# Patient Record
Sex: FEMALE | Race: WHITE | Employment: UNEMPLOYED | URBAN - METROPOLITAN AREA
[De-identification: names, ages, dates, MRNs, and addresses within clinical notes are randomized per-mention and may not be internally consistent; named-entity substitution may affect disease eponyms.]

---

## 2022-04-11 ENCOUNTER — HOSPITAL ENCOUNTER (EMERGENCY)
Age: 42
Discharge: HOME | End: 2022-04-11
Attending: EMERGENCY MEDICINE
Payer: MEDICARE

## 2022-04-11 VITALS
DIASTOLIC BLOOD PRESSURE: 99 MMHG | OXYGEN SATURATION: 95 % | HEART RATE: 110 BPM | WEIGHT: 293 LBS | TEMPERATURE: 99.1 F | RESPIRATION RATE: 18 BRPM | HEIGHT: 66 IN | SYSTOLIC BLOOD PRESSURE: 150 MMHG | BODY MASS INDEX: 47.09 KG/M2

## 2022-04-11 DIAGNOSIS — R07.89 CHEST WALL PAIN: Primary | ICD-10-CM

## 2022-04-11 DIAGNOSIS — R05.9 COUGH: ICD-10-CM

## 2022-04-11 PROCEDURE — 99283 EMERGENCY DEPT VISIT LOW MDM: CPT

## 2022-04-11 PROCEDURE — 96372 THER/PROPH/DIAG INJ SC/IM: CPT

## 2022-04-11 PROCEDURE — 6360000002 HC RX W HCPCS: Performed by: EMERGENCY MEDICINE

## 2022-04-11 RX ORDER — KETOROLAC TROMETHAMINE 30 MG/ML
30 INJECTION, SOLUTION INTRAMUSCULAR; INTRAVENOUS ONCE
Status: COMPLETED | OUTPATIENT
Start: 2022-04-11 | End: 2022-04-11

## 2022-04-11 RX ORDER — LISINOPRIL 10 MG/1
10 TABLET ORAL DAILY
COMMUNITY
End: 2022-09-29 | Stop reason: ALTCHOICE

## 2022-04-11 RX ADMIN — KETOROLAC TROMETHAMINE 30 MG: 30 INJECTION, SOLUTION INTRAMUSCULAR at 01:36

## 2022-04-11 ASSESSMENT — ENCOUNTER SYMPTOMS
GASTROINTESTINAL NEGATIVE: 1
COUGH: 1
ALLERGIC/IMMUNOLOGIC NEGATIVE: 1
EYES NEGATIVE: 1

## 2022-04-11 ASSESSMENT — PAIN DESCRIPTION - PAIN TYPE: TYPE: ACUTE PAIN

## 2022-04-11 ASSESSMENT — PAIN SCALES - GENERAL
PAINLEVEL_OUTOF10: 4
PAINLEVEL_OUTOF10: 4

## 2022-04-11 ASSESSMENT — PAIN DESCRIPTION - LOCATION: LOCATION: CHEST

## 2022-04-11 ASSESSMENT — PAIN DESCRIPTION - ORIENTATION: ORIENTATION: MID

## 2022-04-11 NOTE — ED PROVIDER NOTES
eMERGENCY dEPARTMENT eNCOUnter      Pt Name: Brooklyn Velasco  MRN: [id number]  [place] 1980  Date of evaluation: 4/11/2022      CHIEF COMPLAINT       Chief Complaint   Patient presents with    Cough     States was seen recently was treated for pneumonia and run out of her medications. HISTORY OF PRESENT ILLNESS    Brooklyn Velasco is a 35 y.o. female who presents  Patient is a 40-year-old female coming in today with cough and chest pain when she coughs for the last several weeks. Patient is well-known to t multiple emergency departments and  has been in for several visits this month for the same complaint. She says that she has been taking azithromycin at home that she was prescribed a few days ago in this ED for bronchitis. She says that she only has chest pain when she coughs and is sharp. No chest pressure. No history of cardiac disease. She has had multiple negative cardiac workups recently. Normal chest x-ray couple days ago. Denies any fevers or chills. No abdominal pain. Patient states that she took her own cough medicine before she came into the emergency department today. She is afebrile and nontoxic looking. Room air saturations are 95%. Of note the patient has had 10 ER visits in the last 1-1/2 months. REVIEW OF SYSTEMS       Review of Systems   Constitutional: Negative. HENT: Negative. Eyes: Negative. Respiratory: Positive for cough. Cardiovascular: Negative. Gastrointestinal: Negative. Endocrine: Negative. Genitourinary: Negative. Musculoskeletal: Negative. Skin: Negative. Allergic/Immunologic: Negative. Neurological: Negative. Hematological: Negative. Psychiatric/Behavioral: Negative. PAST MEDICAL HISTORY    has a past medical history of Diabetes mellitus ([facility] [address] Hernia of anterior abdominal wall, Hypertension, and Restless leg syndrome.     SURGICAL HISTORY      has a past surgical history that includes Appendectomy; back surgery; and Hysterectomy. CURRENT MEDICATIONS       Previous Medications    INSULIN ASPART (NOVOLOG) 100 UNIT/ML INJECTION VIAL    Inject into the skin 3 times daily (before meals)    LISINOPRIL (PRINIVIL;ZESTRIL) 10 MG TABLET    Take 10 mg by mouth daily       ALLERGIES     is allergic to sulfa antibiotics. FAMILY HISTORY     has no family status information on file. family history is not on file. SOCIAL HISTORY      reports that she has never smoked. She does not have any smokeless tobacco history on file. She reports that she does not drink alcohol and does not use drugs. PHYSICAL EXAM     INITIAL VITALS:  height is 5' 6\" (1.676 m) and weight is 135.2 kg (298 lb). Her oral temperature is 99.1 °F (37.3 °C). Her blood pressure is 150/99 (abnormal) and her pulse is 110. Her respiration is 18 and oxygen saturation is 95%. Constitutional: Alert, oriented x3, nontoxic, afebrile, answering questions appropriately, acting properly for age, in no acute distress  HEENT: Extraocular muscles intact, mucus membranes moist, TMs clear bilaterally, no posterior pharyngeal erythema or exudates, Pupils equal, round, reactive to light,   Neck: Trachea midline, Supple without lymphadenopathy, no posterior midline neck tenderness to palpation  Cardiovascular: Regular rhythm and rate no S3, S4, or murmurs  Respiratory: Clear to auscultation bilaterally no wheezes, rhonchi, rales, no respiratory distress  Gastrointestinal: Soft, nontender, nondistended, positive bowel sounds. No rebound, rigidity, or guarding. Musculoskeletal: No extremity pain or swelling  Neurologic: Moving all 4 extremities without difficulty there are no gross focal neurologic deficits  Skin: Warm and dry      DIFFERENTIAL DIAGNOSIS/ MDM:     Pleuritic chest pain that has been worked up multiple times. We will give the patient some Toradol and then discharged home to follow-up with her own doctors.   DIAGNOSTIC RESULTS     EKG: All EKG's are interpreted that there is no critically ER issue for us to pursue tonight.       Denise Huizar MD  Attending Emergency Physician            Denise Huizar MD  04/11/22 117

## 2022-04-12 PROCEDURE — 96374 THER/PROPH/DIAG INJ IV PUSH: CPT

## 2022-04-12 PROCEDURE — 99285 EMERGENCY DEPT VISIT HI MDM: CPT

## 2022-04-13 ENCOUNTER — HOSPITAL ENCOUNTER (EMERGENCY)
Age: 42
Discharge: HOME | End: 2022-04-13
Attending: EMERGENCY MEDICINE
Payer: MEDICARE

## 2022-04-13 ENCOUNTER — APPOINTMENT (OUTPATIENT)
Dept: GENERAL RADIOLOGY | Age: 42
End: 2022-04-13
Payer: MEDICARE

## 2022-04-13 ENCOUNTER — APPOINTMENT (OUTPATIENT)
Dept: CT IMAGING | Age: 42
End: 2022-04-13
Payer: MEDICARE

## 2022-04-13 VITALS
HEART RATE: 107 BPM | DIASTOLIC BLOOD PRESSURE: 89 MMHG | SYSTOLIC BLOOD PRESSURE: 132 MMHG | TEMPERATURE: 99 F | RESPIRATION RATE: 22 BRPM | OXYGEN SATURATION: 96 %

## 2022-04-13 DIAGNOSIS — R07.81 PLEURITIC CHEST PAIN: Primary | ICD-10-CM

## 2022-04-13 LAB
ABSOLUTE EOS #: 0 K/UL (ref 0–0.4)
ABSOLUTE IMMATURE GRANULOCYTE: 0 K/UL (ref 0–0.3)
ABSOLUTE LYMPH #: 4.25 K/UL (ref 1–4.8)
ABSOLUTE MONO #: 0.5 K/UL (ref 0.1–0.8)
ALBUMIN SERPL-MCNC: 4.4 G/DL (ref 3.5–5.2)
ALBUMIN/GLOBULIN RATIO: 1.3 (ref 1–2.5)
ALP BLD-CCNC: 100 U/L (ref 35–104)
ALT SERPL-CCNC: 21 U/L (ref 5–33)
ANION GAP SERPL CALCULATED.3IONS-SCNC: 13 MMOL/L (ref 9–17)
AST SERPL-CCNC: 30 U/L
BASOPHILS # BLD: 0 % (ref 0–2)
BASOPHILS ABSOLUTE: 0 K/UL (ref 0–0.2)
BILIRUB SERPL-MCNC: 0.21 MG/DL (ref 0.3–1.2)
BUN BLDV-MCNC: 10 MG/DL (ref 6–20)
CALCIUM SERPL-MCNC: 9.7 MG/DL (ref 8.6–10.4)
CHLORIDE BLD-SCNC: 98 MMOL/L (ref 98–107)
CO2: 24 MMOL/L (ref 20–31)
CREAT SERPL-MCNC: 0.52 MG/DL (ref 0.5–0.9)
D-DIMER QUANTITATIVE: 0.53 MG/L FEU
EOSINOPHILS RELATIVE PERCENT: 0 % (ref 1–4)
GFR AFRICAN AMERICAN: >60 ML/MIN
GFR NON-AFRICAN AMERICAN: >60 ML/MIN
GFR SERPL CREATININE-BSD FRML MDRD: ABNORMAL ML/MIN/{1.73_M2}
GLUCOSE BLD-MCNC: 362 MG/DL (ref 70–99)
HCT VFR BLD CALC: 43.2 % (ref 36.3–47.1)
HEMOGLOBIN: 14.9 G/DL (ref 11.9–15.1)
IMMATURE GRANULOCYTES: 0 %
LIPASE: 50 U/L (ref 13–60)
LYMPHOCYTES # BLD: 34 % (ref 24–44)
MAGNESIUM: 1.8 MG/DL (ref 1.6–2.6)
MCH RBC QN AUTO: 28.9 PG (ref 25.2–33.5)
MCHC RBC AUTO-ENTMCNC: 34.5 G/DL (ref 28.4–34.8)
MCV RBC AUTO: 83.9 FL (ref 82.6–102.9)
MONOCYTES # BLD: 4 % (ref 1–7)
MORPHOLOGY: NORMAL
NRBC AUTOMATED: 0 PER 100 WBC
PDW BLD-RTO: 13.3 % (ref 11.8–14.4)
PLATELET # BLD: 367 K/UL (ref 138–453)
PMV BLD AUTO: 9.4 FL (ref 8.1–13.5)
POTASSIUM SERPL-SCNC: 4.1 MMOL/L (ref 3.7–5.3)
PRO-BNP: 47 PG/ML
RBC # BLD: 5.15 M/UL (ref 3.95–5.11)
SEG NEUTROPHILS: 62 % (ref 36–66)
SEGMENTED NEUTROPHILS ABSOLUTE COUNT: 7.75 K/UL (ref 1.8–7.7)
SODIUM BLD-SCNC: 135 MMOL/L (ref 135–144)
TOTAL PROTEIN: 7.9 G/DL (ref 6.4–8.3)
TROPONIN, HIGH SENSITIVITY: <6 NG/L (ref 0–14)
WBC # BLD: 12.5 K/UL (ref 3.5–11.3)

## 2022-04-13 PROCEDURE — 71260 CT THORAX DX C+: CPT

## 2022-04-13 PROCEDURE — 84484 ASSAY OF TROPONIN QUANT: CPT

## 2022-04-13 PROCEDURE — 6360000004 HC RX CONTRAST MEDICATION: Performed by: STUDENT IN AN ORGANIZED HEALTH CARE EDUCATION/TRAINING PROGRAM

## 2022-04-13 PROCEDURE — 85379 FIBRIN DEGRADATION QUANT: CPT

## 2022-04-13 PROCEDURE — 6370000000 HC RX 637 (ALT 250 FOR IP): Performed by: STUDENT IN AN ORGANIZED HEALTH CARE EDUCATION/TRAINING PROGRAM

## 2022-04-13 PROCEDURE — 83735 ASSAY OF MAGNESIUM: CPT

## 2022-04-13 PROCEDURE — 83690 ASSAY OF LIPASE: CPT

## 2022-04-13 PROCEDURE — 71045 X-RAY EXAM CHEST 1 VIEW: CPT

## 2022-04-13 PROCEDURE — 93005 ELECTROCARDIOGRAM TRACING: CPT | Performed by: STUDENT IN AN ORGANIZED HEALTH CARE EDUCATION/TRAINING PROGRAM

## 2022-04-13 PROCEDURE — 80053 COMPREHEN METABOLIC PANEL: CPT

## 2022-04-13 PROCEDURE — 85025 COMPLETE CBC W/AUTO DIFF WBC: CPT

## 2022-04-13 PROCEDURE — 6360000002 HC RX W HCPCS: Performed by: STUDENT IN AN ORGANIZED HEALTH CARE EDUCATION/TRAINING PROGRAM

## 2022-04-13 PROCEDURE — 83880 ASSAY OF NATRIURETIC PEPTIDE: CPT

## 2022-04-13 RX ORDER — LORAZEPAM 0.5 MG/1
0.5 TABLET ORAL ONCE
Status: COMPLETED | OUTPATIENT
Start: 2022-04-13 | End: 2022-04-13

## 2022-04-13 RX ORDER — GUAIFENESIN 600 MG/1
600 TABLET, EXTENDED RELEASE ORAL 2 TIMES DAILY
Qty: 30 TABLET | Refills: 0 | Status: SHIPPED | OUTPATIENT
Start: 2022-04-13 | End: 2022-04-28

## 2022-04-13 RX ORDER — GUAIFENESIN 600 MG/1
600 TABLET, EXTENDED RELEASE ORAL ONCE
Status: DISCONTINUED | OUTPATIENT
Start: 2022-04-13 | End: 2022-04-13 | Stop reason: HOSPADM

## 2022-04-13 RX ORDER — ORPHENADRINE CITRATE 30 MG/ML
60 INJECTION INTRAMUSCULAR; INTRAVENOUS ONCE
Status: DISCONTINUED | OUTPATIENT
Start: 2022-04-13 | End: 2022-04-13

## 2022-04-13 RX ORDER — LORAZEPAM 0.5 MG/1
1 TABLET ORAL ONCE
Status: DISCONTINUED | OUTPATIENT
Start: 2022-04-13 | End: 2022-04-13 | Stop reason: HOSPADM

## 2022-04-13 RX ORDER — MORPHINE SULFATE 4 MG/ML
4 INJECTION, SOLUTION INTRAMUSCULAR; INTRAVENOUS ONCE
Status: COMPLETED | OUTPATIENT
Start: 2022-04-13 | End: 2022-04-13

## 2022-04-13 RX ORDER — BENZONATATE 100 MG/1
100 CAPSULE ORAL 3 TIMES DAILY PRN
Qty: 15 CAPSULE | Refills: 0 | Status: SHIPPED | OUTPATIENT
Start: 2022-04-13 | End: 2022-04-20

## 2022-04-13 RX ADMIN — LORAZEPAM 0.5 MG: 0.5 TABLET ORAL at 04:15

## 2022-04-13 RX ADMIN — IOPAMIDOL 85 ML: 755 INJECTION, SOLUTION INTRAVENOUS at 04:42

## 2022-04-13 RX ADMIN — MORPHINE SULFATE 4 MG: 4 INJECTION INTRAVENOUS at 04:09

## 2022-04-13 NOTE — ED NOTES
35year old female presents with chest pain and cough  No dyspnea noted  Verbalizes has green sputum  Patient verbalizes loss of appetite and smell to other caregiver  Patient is a/o, mother at bedside  Patient placed on cardiac monitor, BP cuff and pulse ox. Alarms set.   Call light given to patient      Rocky Quinteros RN  04/13/22 3526

## 2022-04-13 NOTE — ED PROVIDER NOTES
[facility]  ED  Emergency Department Encounter  Emergency Medicine Resident     Pt Name: Brooklyn Velasco  MRN: [id number]  [place] 1980  Date of evaluation: 4/13/22  PCP:  Andrea Leung MD    [address] [place] [address]       Chief Complaint   Patient presents with    Cough     dx with bronchitis. States medication is not working. Coughing up green mucus        HISTORY OFPRESENT ILLNESS  (Location/Symptom, Timing/Onset, Context/Setting, Quality, Duration, Modifying Factors,Severity.)      Brooklyn Velasco is a 35 y. o.yo female who presents with pleuritic chest pain and cough with associated shortness of breath. Patient states that pain has been intermittent for the past couple days has progressively worsened. Patient reports that she was actually being treated by her family doctor for bronchitis with azithromycin, she recently finished antibiotic 2 days ago however she feels as though her symptoms has came on full-blown. Patient denies any headache but states she is having generalized body aches, she denies nausea, vomiting, abdominal pain. PAST MEDICAL / SURGICAL / SOCIAL / FAMILY HISTORY      has a past medical history of Diabetes mellitus ([address] Hernia of anterior abdominal wall, Hypertension, and Restless leg syndrome. has a past surgical history that includes Appendectomy; back surgery; and Hysterectomy.      Social History     Socioeconomic History    Marital status: Single     Spouse name: Not on file    Number of children: Not on file    Years of education: Not on file    Highest education level: Not on file   Occupational History    Not on file   Tobacco Use    Smoking status: Never Smoker    Smokeless tobacco: Not on file   Substance and Sexual Activity    Alcohol use: Never    Drug use: Never    Sexual activity: Never   Other Topics Concern    Not on file   Social History Narrative    Not on file     Social Determinants of Health     Financial Resource Strain:     Difficulty of Paying Living Expenses: Not on file   Food Insecurity:     Worried About Running Out of Food in the Last Year: Not on file    Ran Out of Food in the Last Year: Not on file   Transportation Needs:     Lack of Transportation (Medical): Not on file    Lack of Transportation (Non-Medical): Not on file   Physical Activity:     Days of Exercise per Week: Not on file    Minutes of Exercise per Session: Not on file   Stress:     Feeling of Stress : Not on file   Social Connections:     Frequency of Communication with Friends and Family: Not on file    Frequency of Social Gatherings with Friends and Family: Not on file    Attends Synagogue Services: Not on file    Active Member of [address] [place] [address] [employer] or Organizations: Not on file    Attends Club or Organization Meetings: Not on file    Marital Status: Not on file   Intimate Partner Violence:     Fear of Current or Ex-Partner: Not on file    Emotionally Abused: Not on file    Physically Abused: Not on file    Sexually Abused: Not on file   Housing Stability:     Unable to Pay for Housing in the Last Year: Not on file    Number of [place] in the Last Year: Not on file    Unstable Housing in the Last Year: Not on file       History reviewed. No pertinent family history. Allergies:  Sulfa antibiotics    Home Medications:  Prior to Admission medications    Medication Sig Start Date End Date Taking?  Authorizing Provider   benzonatate (TESSALON PERLES) 100 MG capsule Take 1 capsule by mouth 3 times daily as needed for Cough 4/13/22 4/20/22 Yes Mehreen Florian MD   guaiFENesin (MUCINEX) 600 MG extended release tablet Take 1 tablet by mouth 2 times daily for 15 days 4/13/22 4/28/22 Yes Mehreen Florian MD   lisinopril (PRINIVIL;ZESTRIL) 10 MG tablet Take 10 mg by mouth daily    Historical Provider, MD   insulin aspart (NOVOLOG) 100 UNIT/ML injection vial Inject into the skin 3 times daily (before meals)    Historical Provider, MD       REVIEW OFSYSTEMS    (2-9 systems for level 4, 10 or more for level 5)      Review of Systems   Constitutional: Positive for fatigue. Negative for fever. Respiratory: Positive for shortness of breath. Cardiovascular: Positive for chest pain. Negative for leg swelling. Gastrointestinal: Negative for abdominal pain, nausea and vomiting. Musculoskeletal: Positive for myalgias. PHYSICAL EXAM   (up to 7 for level 4, 8 or more forlevel 5)      INITIAL VITALS:   ED Triage Vitals [04/12/22 2357]   BP Temp Temp src Pulse Resp SpO2 Height Weight   (!) 150/106 99 °F (37.2 °C) -- 103 21 95 % -- --       Physical Exam  Constitutional:       General: She is not in acute distress. HENT:      Head: Normocephalic and atraumatic. Mouth/Throat:      Mouth: Mucous membranes are moist.   Eyes:      Extraocular Movements: Extraocular movements intact. Pupils: Pupils are equal, round, and reactive to light. Cardiovascular:      Rate and Rhythm: Tachycardia present. Pulmonary:      Effort: Pulmonary effort is normal. No respiratory distress. Abdominal:      General: There is no distension. Musculoskeletal:         General: No swelling or tenderness. Cervical back: No rigidity or tenderness. Skin:     Capillary Refill: Capillary refill takes less than 2 seconds. Coloration: Skin is not jaundiced. Neurological:      General: No focal deficit present. Mental Status: She is alert.    Psychiatric:         Mood and Affect: Mood normal.         DIFFERENTIAL  DIAGNOSIS     PLAN (LABS / IMAGING / EKG):  Orders Placed This Encounter   Procedures    XR CHEST PORTABLE    CT CHEST PULMONARY EMBOLISM W CONTRAST    CBC with Auto Differential    D-Dimer, Quantitative    Troponin    Comprehensive Metabolic Panel    Magnesium    Lipase    Brain Natriuretic Peptide    Inpatient consult to Hospitalist    EKG 12 Lead       MEDICATIONS ORDERED:  Orders Placed This Encounter   Medications    iopamidol (ISOVUE-370) 76 % injection 85 mL    morphine injection 4 mg    DISCONTD: orphenadrine (NORFLEX) injection 60 mg    DISCONTD: LORazepam (ATIVAN) tablet 1 mg    LORazepam (ATIVAN) tablet 0.5 mg    DISCONTD: guaiFENesin (MUCINEX) extended release tablet 600 mg    benzonatate (TESSALON PERLES) 100 MG capsule     Sig: Take 1 capsule by mouth 3 times daily as needed for Cough     Dispense:  15 capsule     Refill:  0    guaiFENesin (MUCINEX) 600 MG extended release tablet     Sig: Take 1 tablet by mouth 2 times daily for 15 days     Dispense:  30 tablet     Refill:  0       Initial MDM/Plan: 35 y.o. female who presents with pleuritic chest pain with cough. Patient is tachycardic, but not hypoxic  Plan to get a dimer, if elevated, plan for CT chest  We will also get EKG, troponin, BNP, CBC and electrolytes  Disposition is pending on labs and imaging    DIAGNOSTIC RESULTS / EMERGENCYDEPARTMENT COURSE / MDM     LABS:  Labs Reviewed   CBC WITH AUTO DIFFERENTIAL - Abnormal; Notable for the following components:       Result Value    WBC 12.5 (*)     RBC 5.15 (*)     Eosinophils % 0 (*)     Segs Absolute 7.75 (*)     All other components within normal limits   COMPREHENSIVE METABOLIC PANEL - Abnormal; Notable for the following components:    Glucose 362 (*)     Total Bilirubin 0.21 (*)     All other components within normal limits   D-DIMER, QUANTITATIVE   TROPONIN   MAGNESIUM   LIPASE   BRAIN NATRIURETIC PEPTIDE         RADIOLOGY:  CT CHEST PULMONARY EMBOLISM W CONTRAST    Result Date: 4/13/2022  EXAMINATION: CTA OF THE CHEST 4/13/2022 3:45 am TECHNIQUE: CTA of the chest was performed after the administration of intravenous contrast.  Multiplanar reformatted images are provided for review. MIP images are provided for review. Dose modulation, iterative reconstruction, and/or weight based adjustment of the mA/kV was utilized to reduce the radiation dose to as low as reasonably achievable. COMPARISON: None.  HISTORY: ORDERING SYSTEM PROVIDED HISTORY: pleuritic chest pain with elevated dimer TECHNOLOGIST PROVIDED HISTORY: pleuritic chest pain with elevated dimer Decision Support Exception - unselect if not a suspected or confirmed emergency medical condition->Emergency Medical Condition (MA) Reason for Exam: pleuritic chest pain with elevated dimer =0.53 FINDINGS: Pulmonary Arteries: There is no significant intravascular contrast, therefore this is a nondiagnostic exam for evaluation of the pulmonary arteries. Mediastinum: No pericardial effusion or lymphadenopathy. Lungs/pleura: Respiratory motion artifact. No consolidation. No evidence for septal thickening. No effusion. The central airway is patent. Upper Abdomen: No acute findings identified. Soft Tissues/Bones: No acute bone or soft tissue abnormality. Multilevel vertebroplasty in the lower thoracic and upper lumbar spine. 1.  Nondiagnostic exam to evaluate for pulmonary embolism, as there is no significant intravascular contrast present. 2.  Respiratory motion artifact. No acute airspace disease identified. EKG      All EKG's are interpreted by the Emergency Department Physicianwho either signs or Co-signs this chart in the absence of a cardiologist.    EMERGENCY DEPARTMENT COURSE:  ED Course as of 04/14/22 0236   Wed Apr 13, 2022   0531 Pt CT chest was non-diagnostic due to lack of contrast because patient was moving her arm when contrast was been injected. And thus unable to visualize if there is a pulmonary embolism. I did have this discussion with patient and her mom who is in the room. Explained to patient that because the CT scan was nondiagnostic would like to have them admitted to get VQ scan done patient reports that she does not want to stay and instead wants to go home. Patient states that she will come back if her symptoms are worsening.   I did explain to patient at length that we are concerned for pulmonary embolism and that a PE can can be severely detrimental including causing a cardiac arrest, death. Patient and mother expressed their understanding. Patient does have decisional making capacity. [AN]      ED Course User Index  [AN] Seda Brink MD          PROCEDURES:  None    CONSULTS:  IP CONSULT TO HOSPITALIST    CRITICAL CARE:      FINAL IMPRESSION      1.  Pleuritic chest pain          DISPOSITION / PLAN     DISPOSITION Decision To Discharge 04/13/2022 05:30:25 AM      PATIENT REFERRED TO:  [facility]  [address] [address]  [phone]    If symptoms worsen    Jarrod Banuelos MD  [address] [facility]  [phone]      As needed      DISCHARGE MEDICATIONS:  Discharge Medication List as of 4/13/2022  5:38 AM      START taking these medications    Details   benzonatate (TESSALON PERLES) 100 MG capsule Take 1 capsule by mouth 3 times daily as needed for Cough, Disp-15 capsule, R-0Print      guaiFENesin (MUCINEX) 600 MG extended release tablet Take 1 tablet by mouth 2 times daily for 15 days, Disp-30 tablet, R-0Print             Seda Brink MD  Emergency Medicine Resident    (Please note that portions of this note were completed with a voice recognition program.Efforts were made to edit the dictations but occasionally words are mis-transcribed.)        Seda Brink MD  Resident  04/14/22 2142

## 2022-04-13 NOTE — ED PROVIDER NOTES
[facility]     Emergency Department     Faculty Attestation    I performed a history and physical examination of the patient and discussed management with the resident. I have reviewed and agree with the residents findings including all diagnostic interpretations, and treatment plans as written. Any areas of disagreement are noted on the chart. I was personally present for the key portions of any procedures. I have documented in the chart those procedures where I was not present during the key portions. I have reviewed the emergency nurses triage note. I agree with the chief complaint, past medical history, past surgical history, allergies, medications, social and family history as documented unless otherwise noted below. Documentation of the HPI, Physical Exam and Medical Decision Making performed by scribcharles is based on my personal performance of the HPI, PE and MDM. For Physician Assistant/ Nurse Practitioner cases/documentation I have personally evaluated this patient and have completed at least one if not all key elements of the E/M (history, physical exam, and MDM). Additional findings are as noted. 34 yo F c/o coughing green mucus, fever, no vomit,   pe vss gcs 15 joanne, posterior pharynx clear, no exudate, neck supple,   No calf tenderness, 1 + ankle edema,     Trop <6, ct r/o pe non diagnostic, pt not wishing to have further image,   Risk discussed, pt appears to have decision making capacity    EKG Interpretation    Interpreted by me  Sinus tachycardia, heart rate 110, no ischemia, left axis, QT corrected 438    CRITICAL CARE: There was a high probability of clinically significant/life threatening deterioration in this patient's condition which required my urgent intervention. Total critical care time was 5 minutes. This excludes any time for separately reportable procedures.        [address]  04/13/22 2600 Carlito Hercules, DO  04/13/22 Digna Capellan, DO  04/15/22 8383

## 2022-04-13 NOTE — ED NOTES
The following labs obtained by [facility] RN, labeled with pt sticker and tubed to lab: by Pavel student    [x] Blue     [x] Uzair Caldwell   [] on ice  [x] Green/yellow  [] Green/black [] on ice  [] Yellow  [] Red  [] Pink      [] COVID-19 swab    [] Rapid  [] PCR  [] Flu swab  [] Peds Viral Panel     [] Urine Sample  [] Pelvic Cultures  [] Blood Cultures            Li Garzon RN  04/13/22 7749

## 2022-04-13 NOTE — ED NOTES
Report given to Joy Hannah RN  No change in patient status  Continues to rest quietly       [facility]  04/13/22 9783

## 2022-04-14 LAB
EKG ATRIAL RATE: 110 BPM
EKG P AXIS: 17 DEGREES
EKG P-R INTERVAL: 132 MS
EKG Q-T INTERVAL: 324 MS
EKG QRS DURATION: 72 MS
EKG QTC CALCULATION (BAZETT): 438 MS
EKG R AXIS: -14 DEGREES
EKG T AXIS: 23 DEGREES
EKG VENTRICULAR RATE: 110 BPM

## 2022-04-14 PROCEDURE — 93010 ELECTROCARDIOGRAM REPORT: CPT | Performed by: INTERNAL MEDICINE

## 2022-04-14 ASSESSMENT — ENCOUNTER SYMPTOMS
SHORTNESS OF BREATH: 1
VOMITING: 0
NAUSEA: 0
ABDOMINAL PAIN: 0

## 2022-04-21 ENCOUNTER — APPOINTMENT (OUTPATIENT)
Dept: GENERAL RADIOLOGY | Age: 42
End: 2022-04-21
Payer: MEDICARE

## 2022-04-21 ENCOUNTER — APPOINTMENT (OUTPATIENT)
Dept: CT IMAGING | Age: 42
End: 2022-04-21
Payer: MEDICARE

## 2022-04-21 ENCOUNTER — HOSPITAL ENCOUNTER (EMERGENCY)
Age: 42
Discharge: HOME | End: 2022-04-22
Attending: EMERGENCY MEDICINE
Payer: MEDICARE

## 2022-04-21 DIAGNOSIS — J18.9 PNEUMONIA DUE TO INFECTIOUS ORGANISM, UNSPECIFIED LATERALITY, UNSPECIFIED PART OF LUNG: Primary | ICD-10-CM

## 2022-04-21 LAB
ABSOLUTE EOS #: 0.37 K/UL (ref 0–0.44)
ABSOLUTE IMMATURE GRANULOCYTE: 0.03 K/UL (ref 0–0.3)
ABSOLUTE LYMPH #: 3.68 K/UL (ref 1.1–3.7)
ABSOLUTE MONO #: 0.52 K/UL (ref 0.1–1.2)
ALBUMIN SERPL-MCNC: 4.2 G/DL (ref 3.5–5.2)
ALBUMIN/GLOBULIN RATIO: 1.5 (ref 1–2.5)
ALP BLD-CCNC: 102 U/L (ref 35–104)
ALT SERPL-CCNC: 21 U/L (ref 5–33)
ANION GAP SERPL CALCULATED.3IONS-SCNC: 14 MMOL/L (ref 9–17)
AST SERPL-CCNC: 37 U/L
BASOPHILS # BLD: 1 % (ref 0–2)
BASOPHILS ABSOLUTE: 0.07 K/UL (ref 0–0.2)
BILIRUB SERPL-MCNC: 0.18 MG/DL (ref 0.3–1.2)
BUN BLDV-MCNC: 9 MG/DL (ref 6–20)
CALCIUM SERPL-MCNC: 9.7 MG/DL (ref 8.6–10.4)
CHLORIDE BLD-SCNC: 98 MMOL/L (ref 98–107)
CHP ED QC CHECK: YES
CO2: 26 MMOL/L (ref 20–31)
CREAT SERPL-MCNC: 0.62 MG/DL (ref 0.5–0.9)
EOSINOPHILS RELATIVE PERCENT: 3 % (ref 1–4)
GFR AFRICAN AMERICAN: >60 ML/MIN
GFR NON-AFRICAN AMERICAN: >60 ML/MIN
GFR SERPL CREATININE-BSD FRML MDRD: ABNORMAL ML/MIN/{1.73_M2}
GLUCOSE BLD-MCNC: 344 MG/DL
GLUCOSE BLD-MCNC: 344 MG/DL (ref 65–105)
GLUCOSE BLD-MCNC: 415 MG/DL (ref 70–99)
HCG QUALITATIVE: NEGATIVE
HCT VFR BLD CALC: 44.5 % (ref 36.3–47.1)
HEMOGLOBIN: 14.2 G/DL (ref 11.9–15.1)
IMMATURE GRANULOCYTES: 0 %
LYMPHOCYTES # BLD: 31 % (ref 24–43)
MAGNESIUM: 1.8 MG/DL (ref 1.6–2.6)
MCH RBC QN AUTO: 28 PG (ref 25.2–33.5)
MCHC RBC AUTO-ENTMCNC: 31.9 G/DL (ref 28.4–34.8)
MCV RBC AUTO: 87.8 FL (ref 82.6–102.9)
MONOCYTES # BLD: 4 % (ref 3–12)
NRBC AUTOMATED: 0 PER 100 WBC
PDW BLD-RTO: 13.5 % (ref 11.8–14.4)
PLATELET # BLD: 391 K/UL (ref 138–453)
PMV BLD AUTO: 9.5 FL (ref 8.1–13.5)
POTASSIUM SERPL-SCNC: 4.2 MMOL/L (ref 3.7–5.3)
PRO-BNP: 47 PG/ML
RBC # BLD: 5.07 M/UL (ref 3.95–5.11)
SEG NEUTROPHILS: 60 % (ref 36–65)
SEGMENTED NEUTROPHILS ABSOLUTE COUNT: 7.14 K/UL (ref 1.5–8.1)
SODIUM BLD-SCNC: 138 MMOL/L (ref 135–144)
TOTAL PROTEIN: 7 G/DL (ref 6.4–8.3)
TROPONIN, HIGH SENSITIVITY: <6 NG/L (ref 0–14)
WBC # BLD: 11.8 K/UL (ref 3.5–11.3)

## 2022-04-21 PROCEDURE — 96375 TX/PRO/DX INJ NEW DRUG ADDON: CPT

## 2022-04-21 PROCEDURE — 82947 ASSAY GLUCOSE BLOOD QUANT: CPT

## 2022-04-21 PROCEDURE — 93005 ELECTROCARDIOGRAM TRACING: CPT | Performed by: STUDENT IN AN ORGANIZED HEALTH CARE EDUCATION/TRAINING PROGRAM

## 2022-04-21 PROCEDURE — 71045 X-RAY EXAM CHEST 1 VIEW: CPT

## 2022-04-21 PROCEDURE — 84484 ASSAY OF TROPONIN QUANT: CPT

## 2022-04-21 PROCEDURE — 83735 ASSAY OF MAGNESIUM: CPT

## 2022-04-21 PROCEDURE — 6360000002 HC RX W HCPCS: Performed by: STUDENT IN AN ORGANIZED HEALTH CARE EDUCATION/TRAINING PROGRAM

## 2022-04-21 PROCEDURE — 80053 COMPREHEN METABOLIC PANEL: CPT

## 2022-04-21 PROCEDURE — 96374 THER/PROPH/DIAG INJ IV PUSH: CPT

## 2022-04-21 PROCEDURE — 71260 CT THORAX DX C+: CPT

## 2022-04-21 PROCEDURE — 6360000002 HC RX W HCPCS: Performed by: EMERGENCY MEDICINE

## 2022-04-21 PROCEDURE — 6360000004 HC RX CONTRAST MEDICATION: Performed by: STUDENT IN AN ORGANIZED HEALTH CARE EDUCATION/TRAINING PROGRAM

## 2022-04-21 PROCEDURE — 85025 COMPLETE CBC W/AUTO DIFF WBC: CPT

## 2022-04-21 PROCEDURE — 83880 ASSAY OF NATRIURETIC PEPTIDE: CPT

## 2022-04-21 PROCEDURE — 84703 CHORIONIC GONADOTROPIN ASSAY: CPT

## 2022-04-21 PROCEDURE — 99285 EMERGENCY DEPT VISIT HI MDM: CPT

## 2022-04-21 PROCEDURE — 2580000003 HC RX 258: Performed by: STUDENT IN AN ORGANIZED HEALTH CARE EDUCATION/TRAINING PROGRAM

## 2022-04-21 RX ORDER — LORAZEPAM 2 MG/ML
1 INJECTION INTRAMUSCULAR ONCE
Status: COMPLETED | OUTPATIENT
Start: 2022-04-21 | End: 2022-04-21

## 2022-04-21 RX ORDER — AZITHROMYCIN 250 MG/1
TABLET, FILM COATED ORAL
Qty: 1 PACKET | Refills: 0 | Status: SHIPPED | OUTPATIENT
Start: 2022-04-21 | End: 2022-05-01

## 2022-04-21 RX ORDER — LORAZEPAM 2 MG/ML
2 INJECTION INTRAMUSCULAR ONCE
Status: DISCONTINUED | OUTPATIENT
Start: 2022-04-21 | End: 2022-04-21

## 2022-04-21 RX ORDER — MORPHINE SULFATE 4 MG/ML
2 INJECTION, SOLUTION INTRAMUSCULAR; INTRAVENOUS ONCE
Status: COMPLETED | OUTPATIENT
Start: 2022-04-21 | End: 2022-04-21

## 2022-04-21 RX ORDER — 0.9 % SODIUM CHLORIDE 0.9 %
1000 INTRAVENOUS SOLUTION INTRAVENOUS ONCE
Status: COMPLETED | OUTPATIENT
Start: 2022-04-21 | End: 2022-04-21

## 2022-04-21 RX ORDER — 0.9 % SODIUM CHLORIDE 0.9 %
1000 INTRAVENOUS SOLUTION INTRAVENOUS ONCE
Status: COMPLETED | OUTPATIENT
Start: 2022-04-21 | End: 2022-04-22

## 2022-04-21 RX ADMIN — LORAZEPAM 1 MG: 2 INJECTION INTRAMUSCULAR; INTRAVENOUS at 22:37

## 2022-04-21 RX ADMIN — MORPHINE SULFATE 2 MG: 4 INJECTION INTRAVENOUS at 22:37

## 2022-04-21 RX ADMIN — CEFTRIAXONE SODIUM 1000 MG: 1 INJECTION, POWDER, FOR SOLUTION INTRAMUSCULAR; INTRAVENOUS at 23:42

## 2022-04-21 RX ADMIN — IOPAMIDOL 85 ML: 755 INJECTION, SOLUTION INTRAVENOUS at 22:51

## 2022-04-21 RX ADMIN — SODIUM CHLORIDE 1000 ML: 9 INJECTION, SOLUTION INTRAVENOUS at 23:43

## 2022-04-21 RX ADMIN — SODIUM CHLORIDE 1000 ML: 9 INJECTION, SOLUTION INTRAVENOUS at 21:20

## 2022-04-22 VITALS
TEMPERATURE: 98.8 F | WEIGHT: 293 LBS | OXYGEN SATURATION: 94 % | HEART RATE: 114 BPM | DIASTOLIC BLOOD PRESSURE: 84 MMHG | RESPIRATION RATE: 22 BRPM | BODY MASS INDEX: 48.42 KG/M2 | SYSTOLIC BLOOD PRESSURE: 142 MMHG

## 2022-04-22 ASSESSMENT — ENCOUNTER SYMPTOMS
SHORTNESS OF BREATH: 1
NAUSEA: 0
COUGH: 1
ABDOMINAL PAIN: 0
VOMITING: 0

## 2022-04-22 NOTE — ED NOTES
Pt reports to the ED with complaints of chest pain. Pt was seen recently in the ED and was recommended to have a CT scan to rule out PE, but the scan was never completed. Pt stating, \"my PCP told me not to. \" Pt stating she tested positive for covid and flu yesterday, but after further evaluation, results are showing negative and pt was diagnosed with an ear infection. Pt is alert and speaking in complete sentences. Pt is resting in bed comfortably, NAD noted. EKG obtained.  Pt placed on full cardiac monitor     Meghana Ingram RN  04/21/22 4894

## 2022-04-22 NOTE — ED NOTES
The following labs labeled with pt sticker and tubed to lab:     [x] Blue     [x] Lavender   [] on ice  [x] Green/yellow  [] Green/black [] on ice  [x] Yellow  [] Red  [] Pink      [] COVID-19 swab    [] Rapid  [] PCR  [] Flu swab  [] Peds Viral Panel     [] Urine Sample  [] Pelvic Cultures  [] Blood Cultures          Mecredes Banks RN  04/21/22 5405

## 2022-04-22 NOTE — ED PROVIDER NOTES
[facility] ED  Emergency Department Encounter  Emergency Medicine Resident     Pt Name: Sharonda Tam  MRN: [id number]  [place] 1980  Date of evaluation: 4/21/22  PCP:  Ancelmo Gonzalez MD    CHIEF COMPLAINT       Chief Complaint   Patient presents with    Chest Pain    Illness    Mental Health Problem       HISTORY OFPRESENT ILLNESS  (Location/Symptom, Timing/Onset, Context/Setting, Quality, Duration, Modifying Factors,Severity.)      Sharonda Tam is a 35 y. o.yo female who frequently presents to the emergency room due to chest pain or shortness of breath, was recently seen at urgent care yesterday and was found to have negative flu and negative COVID. Patient states that her symptoms are worsening and thus presents to be evaluated. Patient states that her PCP does not want her to be had any CT scans done. Patient denies any history of long distance travel, recent surgery, recent procedure, history of DVT or pulmonary embolism. Her main concern is chest pain with cough and shortness of breath mainly with ambulation. PAST MEDICAL / SURGICAL / SOCIAL / FAMILY HISTORY      has a past medical history of Diabetes mellitus ([address] Hernia of anterior abdominal wall, Hypertension, and Restless leg syndrome. has a past surgical history that includes Appendectomy; back surgery; and Hysterectomy.      Social History     Socioeconomic History    Marital status: Single     Spouse name: Not on file    Number of children: Not on file    Years of education: Not on file    Highest education level: Not on file   Occupational History    Not on file   Tobacco Use    Smoking status: Never Smoker    Smokeless tobacco: Not on file   Substance and Sexual Activity    Alcohol use: Never    Drug use: Never    Sexual activity: Never   Other Topics Concern    Not on file   Social History Narrative    Not on file     Social Determinants of Health     Financial Resource Strain:     Difficulty of Paying Living Expenses: Not on file   Food Insecurity:     Worried About Running Out of Food in the Last Year: Not on file    Ran Out of Food in the Last Year: Not on file   Transportation Needs:     Lack of Transportation (Medical): Not on file    Lack of Transportation (Non-Medical): Not on file   Physical Activity:     Days of Exercise per Week: Not on file    Minutes of Exercise per Session: Not on file   Stress:     Feeling of Stress : Not on file   Social Connections:     Frequency of Communication with Friends and Family: Not on file    Frequency of Social Gatherings with Friends and Family: Not on file    Attends Mandaen Services: Not on file    Active Member of [address] [place] [address] [employer] or Organizations: Not on file    Attends Club or Organization Meetings: Not on file    Marital Status: Not on file   Intimate Partner Violence:     Fear of Current or Ex-Partner: Not on file    Emotionally Abused: Not on file    Physically Abused: Not on file    Sexually Abused: Not on file   Housing Stability:     Unable to Pay for Housing in the Last Year: Not on file    Number of [place] in the Last Year: Not on file    Unstable Housing in the Last Year: Not on file       History reviewed. No pertinent family history. Allergies:  Sulfa antibiotics    Home Medications:  Prior to Admission medications    Medication Sig Start Date End Date Taking?  Authorizing Provider   azithromycin (ZITHROMAX) 250 MG tablet Take 2 tablets (500 mg) on Day 1, followed by 1 tablet (250 mg) once daily on Days 2 through 5. 4/21/22 5/1/22 Yes Tamar Terrell MD   guaiFENesin (MUCINEX) 600 MG extended release tablet Take 1 tablet by mouth 2 times daily for 15 days 4/13/22 4/28/22  Tamar Terrell MD   lisinopril (PRINIVIL;ZESTRIL) 10 MG tablet Take 10 mg by mouth daily    Historical Provider, MD   insulin aspart (NOVOLOG) 100 UNIT/ML injection vial Inject into the skin 3 times daily (before meals)    Historical Provider, MD REVIEW OFSYSTEMS    (2-9 systems for level 4, 10 or more for level 5)      Review of Systems   Constitutional: Positive for fatigue and fever. Respiratory: Positive for cough and shortness of breath. Cardiovascular: Positive for chest pain. Gastrointestinal: Negative for abdominal pain, nausea and vomiting. PHYSICAL EXAM   (up to 7 for level 4, 8 or more forlevel 5)      INITIAL VITALS:   ED Triage Vitals   BP Temp Temp Source Pulse Resp SpO2 Height Weight   04/21/22 2042 04/21/22 2042 04/21/22 2042 04/21/22 2042 04/21/22 2042 04/21/22 2042 -- 04/21/22 2110   (!) 142/84 98.8 °F (37.1 °C) Oral 110 21 95 %  300 lb (136.1 kg)       Physical Exam  Constitutional:       Appearance: Normal appearance. HENT:      Head: Normocephalic and atraumatic. Nose: Nose normal.   Eyes:      Extraocular Movements: Extraocular movements intact. Pupils: Pupils are equal, round, and reactive to light. Cardiovascular:      Rate and Rhythm: Tachycardia present. Pulmonary:      Effort: No respiratory distress. Abdominal:      General: There is no distension. Tenderness: There is no abdominal tenderness. Musculoskeletal:         General: No swelling or tenderness. Cervical back: No rigidity. Skin:     Capillary Refill: Capillary refill takes less than 2 seconds. Coloration: Skin is not jaundiced. Neurological:      General: No focal deficit present. Mental Status: She is alert.    Psychiatric:         Mood and Affect: Mood normal.         DIFFERENTIAL  DIAGNOSIS     PLAN (LABS / IMAGING / EKG):  Orders Placed This Encounter   Procedures    XR CHEST PORTABLE    CT CHEST PULMONARY EMBOLISM W CONTRAST    CBC with Auto Differential    Troponin    Brain Natriuretic Peptide    Comprehensive Metabolic Panel    Magnesium    HCG Qualitative, Serum    POCT Glucose    POC Glucose Fingerstick    EKG 12 Lead       MEDICATIONS ORDERED:  Orders Placed This Encounter   Medications    TECHNIQUE: CTA of the chest was performed after the administration of intravenous contrast.  Multiplanar reformatted images are provided for review. MIP images are provided for review. Dose modulation, iterative reconstruction, and/or weight based adjustment of the mA/kV was utilized to reduce the radiation dose to as low as reasonably achievable. COMPARISON: 04/13/2022 HISTORY: ORDERING SYSTEM PROVIDED HISTORY: r/o PE TECHNOLOGIST PROVIDED HISTORY: r/o PE Decision Support Exception - unselect if not a suspected or confirmed emergency medical condition->Emergency Medical Condition (MA) Reason for Exam: r/o PE FINDINGS: Pulmonary Arteries: Pulmonary arteries are adequately opacified for evaluation. No evidence of intraluminal filling defect to suggest pulmonary embolism. Evaluation for subsegmental PE is limited due to respiratory motion and contrast timing. Main pulmonary artery is normal in caliber. Mediastinum: No evidence of mediastinal lymphadenopathy. The heart and pericardium demonstrate no acute abnormality. There is no acute abnormality of the thoracic aorta. Lungs/pleura: The lungs are without acute process. No focal consolidation or pulmonary edema. No evidence of pleural effusion or pneumothorax. Upper Abdomen: Limited images of the upper abdomen are unremarkable. Soft Tissues/Bones: Compression deformity at T10 and T12 with prior kyphoplasty. Mild degenerative change. 1. No large or central pulmonary embolus. 2. No acute pulmonary abnormality. EKG      All EKG's are interpreted by the Emergency Department Physicianwho either signs or Co-signs this chart in the absence of a cardiologist.    EMERGENCY DEPARTMENT COURSE:  ED Course as of 04/22/22 2201 Fri Apr 22, 2022 2200 Patient found to have hyperglycemia 415, after IV fluids, her sugar did come down to 344.   Her x-ray did show pneumonia and thus she will be started on Z-Abraham, she was given a dose of Rocephin here in the emergency room.  Her CT scan was negative for pulmonary embolism, patient to be discharged. [AN]      ED Course User Index  [AN] Tiara Petty MD          PROCEDURES:  None    CONSULTS:  None    CRITICAL CARE:      FINAL IMPRESSION      1.  Pneumonia due to infectious organism, unspecified laterality, unspecified part of lung          DISPOSITION / PLAN     DISPOSITION Decision To Discharge 04/21/2022 11:54:43 PM      PATIENT REFERRED TO:  [facility] ED  [address] [place] [address]  [phone]    If symptoms worsen    Shauna Severs, MD  [address] [place] [address]  [phone]      As needed      DISCHARGE MEDICATIONS:  Discharge Medication List as of 4/21/2022 11:57 PM      START taking these medications    Details   azithromycin (ZITHROMAX) 250 MG tablet Take 2 tablets (500 mg) on Day 1, followed by 1 tablet (250 mg) once daily on Days 2 through 5., Disp-1 packet, R-0Print             Tiara Petty MD  Emergency Medicine Resident    (Please note that portions of this note were completed with a voice recognition program.Efforts were made to edit the dictations but occasionally words are mis-transcribed.)        Tiara Petty MD  Resident  04/22/22 [id number]

## 2022-04-22 NOTE — ED PROVIDER NOTES
[address]   Emergency Department  Emergency Medicine Attending Sign-out     Care of Sharonda Tam was assumed from previous attending Dr. Zeb Cyr and is being seen for Chest Pain, Illness, and Mental Health Problem  . The patient's initial evaluation and plan have been discussed with the prior provider who initially evaluated the patient. Attestation  I was available and discussed any additional care issues that arose and coordinated the management plans with the resident(s) caring for the patient during my duty period. Any areas of disagreement with resident's documentation of care or procedures are noted on the chart. I was personally present for the key portions of any/all procedures, during my duty period. I have documented in the chart those procedures where I was not present during the key portions. BRIEF PATIENT SUMMARY/MDM COURSE PER INITIAL PROVIDER:   RECENT VITALS:     Temp: 98.8 °F (37.1 °C),  Pulse: 110, Resp: 21, BP: (!) 142/84, SpO2: 95 %    This patient is a 35 y.o. Female with history of diabetes noncompliant with chest pain shortness of breath and tachycardia up to the 130s. Chest x-ray read as possible pleural effusion versus pneumonia. Awaiting CT imaging.     DIAGNOSTICS/MEDICATIONS:     MEDICATIONS GIVEN:  ED Medication Orders (From admission, onward)    Start Ordered     Status Ordering Provider    04/21/22 2330 04/21/22 2322  0.9 % sodium chloride bolus  ONCE         Acknowledged ANCA NIX    04/21/22 2315 04/21/22 2311  cefTRIAXone (ROCEPHIN) 1000 mg IVPB in NS 50ml minibag  ONCE        Question:  Antimicrobial Indications  Answer:  Pneumonia (CAP)    Last MAR action: New Bag - by [place] Gene on 04/21/22 at [address]    04/21/22 2245 04/21/22 2235  morphine injection 2 mg  ONCE         Last MAR action: Given - by [place] Gene on 04/21/22 at [address]    04/21/22 2245 04/21/22 2235  LORazepam (ATIVAN) injection 1 mg  ONCE Last MAR action: Given - by Kat Marquez on 04/21/22 at 1 [facility]    04/21/22 2224 04/21/22 2224  iopamidol (ISOVUE-370) 76 % injection 85 mL  IMG ONCE PRN         Last MAR action: Given - by Diane Mcdowell on 04/21/22 at 130 [facility]    04/21/22 2130 04/21/22 2117  0.9 % sodium chloride bolus  ONCE         Last MAR action: Stopped - by Kat Manuelmir on 04/21/22 at 2[address] [facility]          LABS    Labs Reviewed   CBC WITH AUTO DIFFERENTIAL - Abnormal; Notable for the following components:       Result Value    WBC 11.8 (*)     All other components within normal limits   COMPREHENSIVE METABOLIC PANEL - Abnormal; Notable for the following components:    Glucose 415 (*)     AST 37 (*)     Total Bilirubin 0.18 (*)     All other components within normal limits   TROPONIN   BRAIN NATRIURETIC PEPTIDE   MAGNESIUM   HCG, SERUM, QUALITATIVE   POCT GLUCOSE       RADIOLOGY  XR CHEST PORTABLE    Result Date: 4/21/2022  EXAMINATION: ONE XRAY VIEW OF THE CHEST 4/21/2022 10:05 pm COMPARISON: 04/13/2022 HISTORY: ORDERING SYSTEM PROVIDED HISTORY: chest pain TECHNOLOGIST PROVIDED HISTORY: chest pain FINDINGS: Cardiomediastinal silhouette is unchanged. The lungs are underinflated. Small left pleural effusion. There is left basilar opacity. No pneumothorax. No acute osseous abnormality. Left pleural effusion with adjacent opacity, atelectasis versus pneumonia. CT CHEST PULMONARY EMBOLISM W CONTRAST    Result Date: 4/21/2022  EXAMINATION: CTA OF THE CHEST 4/21/2022 10:28 pm TECHNIQUE: CTA of the chest was performed after the administration of intravenous contrast.  Multiplanar reformatted images are provided for review. MIP images are provided for review. Dose modulation, iterative reconstruction, and/or weight based adjustment of the mA/kV was utilized to reduce the radiation dose to as low as reasonably achievable.  COMPARISON: 04/13/2022 HISTORY: ORDERING SYSTEM PROVIDED HISTORY: r/o PE TECHNOLOGIST PROVIDED HISTORY: r/o PE Decision Support Exception - unselect if not a suspected or confirmed emergency medical condition->Emergency Medical Condition (MA) Reason for Exam: r/o PE FINDINGS: Pulmonary Arteries: Pulmonary arteries are adequately opacified for evaluation. No evidence of intraluminal filling defect to suggest pulmonary embolism. Evaluation for subsegmental PE is limited due to respiratory motion and contrast timing. Main pulmonary artery is normal in caliber. Mediastinum: No evidence of mediastinal lymphadenopathy. The heart and pericardium demonstrate no acute abnormality. There is no acute abnormality of the thoracic aorta. Lungs/pleura: The lungs are without acute process. No focal consolidation or pulmonary edema. No evidence of pleural effusion or pneumothorax. Upper Abdomen: Limited images of the upper abdomen are unremarkable. Soft Tissues/Bones: Compression deformity at T10 and T12 with prior kyphoplasty. Mild degenerative change. 1. No large or central pulmonary embolus. 2. No acute pulmonary abnormality.        OUTSTANDING TASKS / ADDITIONAL COMMENTS   1. CT imaging    Caesar Severance, MD  Emergency Medicine Attending  [facility]        Mercedes Stahl MD  04/21/22 4277

## 2022-04-22 NOTE — ED PROVIDER NOTES
[facility]     Emergency Department     Faculty Attestation    I performed a history and physical examination of the patient and discussed management with the resident. I reviewed the residents note and agree with the documented findings and plan of care. Any areas of disagreement are noted on the chart. I was personally present for the key portions of any procedures. I have documented in the chart those procedures where I was not present during the key portions. I have reviewed the emergency nurses triage note. I agree with the chief complaint, past medical history, past surgical history, allergies, medications, social and family history as documented unless otherwise noted below. For Physician Assistant/ Nurse Practitioner cases/documentation I have personally evaluated this patient and have completed at least one if not all key elements of the E/M (history, physical exam, and MDM). Additional findings are as noted. I have personally seen and evaluated the patient. I find the patient's history and physical exam are consistent with the NP/PA documentation. I agree with the care provided, treatment rendered, disposition and follow-up plan. Reporting chest pain and dyspnea is noted to be tachycardic on exam recent evaluation for same patient is a diabetic who is relatively noncompliant with her treatment plan. As she is resting comfortably no acute distress at the present time      Critical Care     Vandana Robledo M.D.   Attending Emergency  Physician              Epi Washburn MD  04/21/22 6311

## 2022-04-23 LAB
EKG ATRIAL RATE: 113 BPM
EKG P AXIS: 31 DEGREES
EKG P-R INTERVAL: 130 MS
EKG Q-T INTERVAL: 318 MS
EKG QRS DURATION: 82 MS
EKG QTC CALCULATION (BAZETT): 436 MS
EKG R AXIS: 8 DEGREES
EKG T AXIS: 31 DEGREES
EKG VENTRICULAR RATE: 113 BPM

## 2022-04-23 PROCEDURE — 93010 ELECTROCARDIOGRAM REPORT: CPT | Performed by: INTERNAL MEDICINE

## 2022-09-29 ENCOUNTER — HOSPITAL ENCOUNTER (OUTPATIENT)
Age: 42
Setting detail: SPECIMEN
Discharge: HOME | End: 2022-09-29

## 2022-09-29 ENCOUNTER — OFFICE VISIT (OUTPATIENT)
Dept: FAMILY MEDICINE CLINIC | Age: 42
End: 2022-09-29
Payer: MEDICARE

## 2022-09-29 VITALS
TEMPERATURE: 97.4 F | HEART RATE: 111 BPM | WEIGHT: 284 LBS | DIASTOLIC BLOOD PRESSURE: 82 MMHG | SYSTOLIC BLOOD PRESSURE: 128 MMHG | OXYGEN SATURATION: 98 % | BODY MASS INDEX: 45.84 KG/M2

## 2022-09-29 DIAGNOSIS — Z00.00 ENCOUNTER FOR MEDICAL EXAMINATION TO ESTABLISH CARE: Primary | ICD-10-CM

## 2022-09-29 DIAGNOSIS — E11.9 TYPE 2 DIABETES MELLITUS WITHOUT COMPLICATION, WITH LONG-TERM CURRENT USE OF INSULIN (HCC): ICD-10-CM

## 2022-09-29 DIAGNOSIS — I10 PRIMARY HYPERTENSION: ICD-10-CM

## 2022-09-29 DIAGNOSIS — M54.41 CHRONIC RIGHT-SIDED LOW BACK PAIN WITH RIGHT-SIDED SCIATICA: ICD-10-CM

## 2022-09-29 DIAGNOSIS — Z79.4 TYPE 2 DIABETES MELLITUS WITHOUT COMPLICATION, WITH LONG-TERM CURRENT USE OF INSULIN (HCC): ICD-10-CM

## 2022-09-29 DIAGNOSIS — M25.551 RIGHT HIP PAIN: ICD-10-CM

## 2022-09-29 DIAGNOSIS — F41.9 ANXIETY: ICD-10-CM

## 2022-09-29 DIAGNOSIS — Z13.29 THYROID DISORDER SCREEN: ICD-10-CM

## 2022-09-29 DIAGNOSIS — G89.29 CHRONIC RIGHT-SIDED LOW BACK PAIN WITH RIGHT-SIDED SCIATICA: ICD-10-CM

## 2022-09-29 DIAGNOSIS — Z13.220 LIPID SCREENING: ICD-10-CM

## 2022-09-29 DIAGNOSIS — F33.0 MILD EPISODE OF RECURRENT MAJOR DEPRESSIVE DISORDER (HCC): ICD-10-CM

## 2022-09-29 DIAGNOSIS — B36.9 FUNGAL DERMATITIS: ICD-10-CM

## 2022-09-29 DIAGNOSIS — G25.81 RLS (RESTLESS LEGS SYNDROME): ICD-10-CM

## 2022-09-29 LAB
ABSOLUTE EOS #: 0.42 K/UL (ref 0–0.44)
ABSOLUTE IMMATURE GRANULOCYTE: 0.05 K/UL (ref 0–0.3)
ABSOLUTE LYMPH #: 3.29 K/UL (ref 1.1–3.7)
ABSOLUTE MONO #: 0.64 K/UL (ref 0.1–1.2)
ALBUMIN SERPL-MCNC: 4.4 G/DL (ref 3.5–5.2)
ALBUMIN/GLOBULIN RATIO: 1.2 (ref 1–2.5)
ALP BLD-CCNC: 89 U/L (ref 35–104)
ALT SERPL-CCNC: 20 U/L (ref 5–33)
ANION GAP SERPL CALCULATED.3IONS-SCNC: 20 MMOL/L (ref 9–17)
AST SERPL-CCNC: 28 U/L
BASOPHILS # BLD: 1 % (ref 0–2)
BASOPHILS ABSOLUTE: 0.09 K/UL (ref 0–0.2)
BILIRUB SERPL-MCNC: <0.1 MG/DL (ref 0.3–1.2)
BUN BLDV-MCNC: 11 MG/DL (ref 6–20)
CALCIUM SERPL-MCNC: 9.6 MG/DL (ref 8.6–10.4)
CHLORIDE BLD-SCNC: 94 MMOL/L (ref 98–107)
CHOLESTEROL, FASTING: 225 MG/DL
CHOLESTEROL/HDL RATIO: 6.4
CO2: 22 MMOL/L (ref 20–31)
CREAT SERPL-MCNC: 0.58 MG/DL (ref 0.5–0.9)
EOSINOPHILS RELATIVE PERCENT: 4 % (ref 1–4)
GFR AFRICAN AMERICAN: >60 ML/MIN
GFR NON-AFRICAN AMERICAN: >60 ML/MIN
GFR SERPL CREATININE-BSD FRML MDRD: ABNORMAL ML/MIN/{1.73_M2}
GLUCOSE BLD-MCNC: 414 MG/DL (ref 70–99)
HCT VFR BLD CALC: 48.4 % (ref 36.3–47.1)
HDLC SERPL-MCNC: 35 MG/DL
HEMOGLOBIN: 15.5 G/DL (ref 11.9–15.1)
IMMATURE GRANULOCYTES: 1 %
LDL CHOLESTEROL: 134 MG/DL (ref 0–130)
LYMPHOCYTES # BLD: 30 % (ref 24–43)
MCH RBC QN AUTO: 28.1 PG (ref 25.2–33.5)
MCHC RBC AUTO-ENTMCNC: 32 G/DL (ref 28.4–34.8)
MCV RBC AUTO: 87.8 FL (ref 82.6–102.9)
MONOCYTES # BLD: 6 % (ref 3–12)
NRBC AUTOMATED: 0 PER 100 WBC
PDW BLD-RTO: 12.8 % (ref 11.8–14.4)
PLATELET # BLD: 394 K/UL (ref 138–453)
PMV BLD AUTO: 10 FL (ref 8.1–13.5)
POTASSIUM SERPL-SCNC: 4.2 MMOL/L (ref 3.7–5.3)
RBC # BLD: 5.51 M/UL (ref 3.95–5.11)
SEG NEUTROPHILS: 58 % (ref 36–65)
SEGMENTED NEUTROPHILS ABSOLUTE COUNT: 6.33 K/UL (ref 1.5–8.1)
SODIUM BLD-SCNC: 136 MMOL/L (ref 135–144)
TOTAL PROTEIN: 8.2 G/DL (ref 6.4–8.3)
TRIGLYCERIDE, FASTING: 280 MG/DL
TSH SERPL DL<=0.05 MIU/L-ACNC: 0.95 UIU/ML (ref 0.3–5)
WBC # BLD: 10.8 K/UL (ref 3.5–11.3)

## 2022-09-29 PROCEDURE — G8417 CALC BMI ABV UP PARAM F/U: HCPCS | Performed by: NURSE PRACTITIONER

## 2022-09-29 PROCEDURE — 1036F TOBACCO NON-USER: CPT | Performed by: NURSE PRACTITIONER

## 2022-09-29 PROCEDURE — 99204 OFFICE O/P NEW MOD 45 MIN: CPT | Performed by: NURSE PRACTITIONER

## 2022-09-29 PROCEDURE — G8428 CUR MEDS NOT DOCUMENT: HCPCS | Performed by: NURSE PRACTITIONER

## 2022-09-29 PROCEDURE — 3046F HEMOGLOBIN A1C LEVEL >9.0%: CPT | Performed by: NURSE PRACTITIONER

## 2022-09-29 PROCEDURE — 2022F DILAT RTA XM EVC RTNOPTHY: CPT | Performed by: NURSE PRACTITIONER

## 2022-09-29 RX ORDER — HYDROCODONE BITARTRATE AND ACETAMINOPHEN 5; 325 MG/1; MG/1
1 TABLET ORAL EVERY 6 HOURS PRN
Qty: 12 TABLET | Refills: 0 | Status: SHIPPED | OUTPATIENT
Start: 2022-09-29 | End: 2022-10-11 | Stop reason: SDUPTHER

## 2022-09-29 RX ORDER — CITALOPRAM 10 MG/1
10 TABLET ORAL DAILY
Qty: 10 TABLET | Refills: 0 | Status: SHIPPED | OUTPATIENT
Start: 2022-09-29 | End: 2022-11-03 | Stop reason: ALTCHOICE

## 2022-09-29 RX ORDER — CYCLOBENZAPRINE HCL 10 MG
10 TABLET ORAL 3 TIMES DAILY
COMMUNITY
Start: 2019-09-01

## 2022-09-29 RX ORDER — CITALOPRAM 20 MG/1
20 TABLET ORAL DAILY
Qty: 30 TABLET | Refills: 2 | Status: SHIPPED | OUTPATIENT
Start: 2022-10-10 | End: 2023-10-10

## 2022-09-29 RX ORDER — NALOXONE HYDROCHLORIDE 4 MG/.1ML
SPRAY NASAL
COMMUNITY
Start: 2022-08-17

## 2022-09-29 RX ORDER — LISINOPRIL AND HYDROCHLOROTHIAZIDE 25; 20 MG/1; MG/1
TABLET ORAL
COMMUNITY
Start: 2022-03-07 | End: 2022-09-29 | Stop reason: SDUPTHER

## 2022-09-29 RX ORDER — ROPINIROLE 2 MG/1
2 TABLET, FILM COATED ORAL DAILY
Qty: 30 TABLET | Refills: 3 | Status: SHIPPED | OUTPATIENT
Start: 2022-09-29 | End: 2023-09-29

## 2022-09-29 RX ORDER — HYDROCODONE BITARTRATE AND ACETAMINOPHEN 5; 325 MG/1; MG/1
1 TABLET ORAL EVERY 6 HOURS PRN
COMMUNITY
Start: 2022-09-20 | End: 2022-09-29 | Stop reason: SDUPTHER

## 2022-09-29 RX ORDER — NYSTATIN 100000 [USP'U]/G
POWDER TOPICAL 2 TIMES DAILY
Qty: 30 G | Refills: 1 | Status: SHIPPED | OUTPATIENT
Start: 2022-09-29 | End: 2022-10-09

## 2022-09-29 RX ORDER — ROPINIROLE 2 MG/1
2 TABLET, FILM COATED ORAL DAILY
COMMUNITY
Start: 2022-03-25 | End: 2022-09-29 | Stop reason: SDUPTHER

## 2022-09-29 RX ORDER — LISINOPRIL AND HYDROCHLOROTHIAZIDE 25; 20 MG/1; MG/1
1 TABLET ORAL DAILY
Qty: 90 TABLET | Refills: 1 | Status: SHIPPED | OUTPATIENT
Start: 2022-09-29 | End: 2023-09-29

## 2022-09-29 SDOH — ECONOMIC STABILITY: FOOD INSECURITY: WITHIN THE PAST 12 MONTHS, THE FOOD YOU BOUGHT JUST DIDN'T LAST AND YOU DIDN'T HAVE MONEY TO GET MORE.: NEVER TRUE

## 2022-09-29 SDOH — ECONOMIC STABILITY: FOOD INSECURITY: WITHIN THE PAST 12 MONTHS, YOU WORRIED THAT YOUR FOOD WOULD RUN OUT BEFORE YOU GOT MONEY TO BUY MORE.: NEVER TRUE

## 2022-09-29 ASSESSMENT — PATIENT HEALTH QUESTIONNAIRE - PHQ9
1. LITTLE INTEREST OR PLEASURE IN DOING THINGS: 0
SUM OF ALL RESPONSES TO PHQ QUESTIONS 1-9: 0
2. FEELING DOWN, DEPRESSED OR HOPELESS: 0
SUM OF ALL RESPONSES TO PHQ9 QUESTIONS 1 & 2: 0

## 2022-09-29 ASSESSMENT — SOCIAL DETERMINANTS OF HEALTH (SDOH): HOW HARD IS IT FOR YOU TO PAY FOR THE VERY BASICS LIKE FOOD, HOUSING, MEDICAL CARE, AND HEATING?: NOT HARD AT ALL

## 2022-09-29 NOTE — PROGRESS NOTES
Camille Quinn (:  1980) is a 38 y.o. female,New patient, here for evaluation of the following chief complaint(s):  Establish Care         ASSESSMENT/PLAN:  1. Encounter for medical examination to establish care  2. Type 2 diabetes mellitus without complication, with long-term current use of insulin (HCC)  -     CBC with Auto Differential; Future  -     Comprehensive Metabolic Panel; Future  -     Hemoglobin A1C; Future  3. Primary hypertension  -     lisinopril-hydroCHLOROthiazide (PRINZIDE;ZESTORETIC) 20-25 MG per tablet; Take 1 tablet by mouth daily, Disp-90 tablet, R-1Normal  -     CBC with Auto Differential; Future  -     Comprehensive Metabolic Panel; Future  4. Mild episode of recurrent major depressive disorder (HCC)  -     citalopram (CELEXA) 10 MG tablet; Take 1 tablet by mouth daily for 10 days, Disp-10 tablet, R-0Normal  -     citalopram (CELEXA) 20 MG tablet; Take 1 tablet by mouth daily, Disp-30 tablet, R-2Normal  5. Anxiety  -     citalopram (CELEXA) 10 MG tablet; Take 1 tablet by mouth daily for 10 days, Disp-10 tablet, R-0Normal  -     citalopram (CELEXA) 20 MG tablet; Take 1 tablet by mouth daily, Disp-30 tablet, R-2Normal  6. Chronic right-sided low back pain with right-sided sciatica  -     HYDROcodone-acetaminophen (NORCO) 5-325 MG per tablet; Take 1 tablet by mouth every 6 hours as needed for Pain for up to 7 days. Patient states she only takes half, Disp-12 tablet, R-0Normal  7. Right hip pain  -     HYDROcodone-acetaminophen (NORCO) 5-325 MG per tablet; Take 1 tablet by mouth every 6 hours as needed for Pain for up to 7 days. Patient states she only takes half, Disp-12 tablet, R-0Normal  8. Fungal dermatitis  -     nystatin (MYCOSTATIN) 493917 UNIT/GM powder; Apply topically 2 times daily for 10 days, Topical, 2 TIMES DAILY Starting Thu 2022, Until Sun 10/9/2022, For 10 days, Disp-30 g, R-1, Normal  9. RLS (restless legs syndrome)  -     rOPINIRole (REQUIP) 2 MG tablet;  Take 1 tablet by mouth daily, Disp-30 tablet, R-3Normal  10. Lipid screening  -     Lipid, Fasting; Future  11. Thyroid disorder screen  -     TSH; Future    Return in about 3 months (around 12/29/2022), or if symptoms worsen or fail to improve, for medication follow up, follow up to testing. Subjective   SUBJECTIVE/OBJECTIVE:  New patient. Asking for refills. [place] for back pain. Lives with her mother. Not . No child. Uses a cane for ambulation. Reporting back pain all the time. Reorts right hip pain. Reports sciatic. Also reports recently diagnosed with cancer Dr. Arabella James. No records. Will call them. Bilarteral arms are red and warm to touch  Depression and anxiety  Redness under both breasts. Likely yeast. Will send nystatin  Tells me she is not keeping appt next week with Dr. Junaid Le to establish care  Talked to Mother. She reports Dr Arabella James and Dr. Valentin Briggs both wanted [facility]to go to [facility] for testing related to cancer? I need records. Will get more pieces and put them together before sending her anywhere. Mother also confirms her MRDD diagnosis. Review of Systems   Constitutional:  Negative for activity change, appetite change, fatigue and fever. HENT:  Negative for congestion, rhinorrhea and sore throat. Eyes:  Negative for visual disturbance. Wears glasses   Respiratory:  Negative for cough and shortness of breath. Cardiovascular:  Negative for chest pain and palpitations. Gastrointestinal:  Negative for abdominal distention, constipation, diarrhea, nausea and vomiting. Endocrine: Negative for polydipsia, polyphagia and polyuria. DM   Genitourinary:  Positive for vaginal discharge. Negative for difficulty urinating, dysuria, frequency and urgency. Musculoskeletal:  Positive for arthralgias, back pain and gait problem. Cane for ambulation   Skin:  Positive for rash (under breasts, groin skin folds). Allergic/Immunologic: Negative for immunocompromised state. Several medication allergies; see list   Neurological:  Negative for syncope, light-headedness and headaches. MRDD   Psychiatric/Behavioral:  Negative for decreased concentration, dysphoric mood, sleep disturbance and suicidal ideas. The patient is not nervous/anxious. Objective   Physical Exam  Vitals and nursing note reviewed. Constitutional:       General: She is not in acute distress. Appearance: She is not ill-appearing. HENT:      Head: Normocephalic. Nose: Nose normal.      Mouth/Throat:      Lips: Pink. Cardiovascular:      Rate and Rhythm: Normal rate. Pulses:           Carotid pulses are 2+ on the right side and 2+ on the left side. Radial pulses are 2+ on the right side and 2+ on the left side. Heart sounds: Normal heart sounds. No murmur heard. Pulmonary:      Effort: Pulmonary effort is normal. No respiratory distress. Breath sounds: No decreased breath sounds, wheezing, rhonchi or rales. Neurological:      Mental Status: She is alert and oriented to person, place, and time. Psychiatric:         Attention and Perception: Attention normal.         Speech: Speech normal.         Behavior: Behavior is cooperative. An electronic signature was used to authenticate this note.     --Vlad Leon, VIVIENNE - NP

## 2022-10-04 ENCOUNTER — TELEPHONE (OUTPATIENT)
Dept: FAMILY MEDICINE CLINIC | Age: 42
End: 2022-10-04

## 2022-10-04 DIAGNOSIS — R11.0 NAUSEA: Primary | ICD-10-CM

## 2022-10-04 LAB
ESTIMATED AVERAGE GLUCOSE: 329 MG/DL
HBA1C MFR BLD: 13.1 % (ref 4–6)

## 2022-10-04 RX ORDER — ONDANSETRON 4 MG/1
4 TABLET, ORALLY DISINTEGRATING ORAL 3 TIMES DAILY PRN
Qty: 21 TABLET | Refills: 0 | Status: SHIPPED | OUTPATIENT
Start: 2022-10-04

## 2022-10-04 NOTE — TELEPHONE ENCOUNTER
Pt states you were suppose to call her to let her know if she was suppose to go to the [address] [place] [address] or not please advise she says she needs to know right away

## 2022-10-04 NOTE — TELEPHONE ENCOUNTER
Spoke to patient via phone. She is aware I do NOT want her to drive to [address] [place] [address]. Awaiting lab results and records from the free clinic she was going to.

## 2022-10-10 ASSESSMENT — ENCOUNTER SYMPTOMS
DIARRHEA: 0
VOMITING: 0
SHORTNESS OF BREATH: 0
CONSTIPATION: 0
BACK PAIN: 1
COUGH: 0
NAUSEA: 0
SORE THROAT: 0
RHINORRHEA: 0
ABDOMINAL DISTENTION: 0

## 2022-10-11 DIAGNOSIS — G89.29 CHRONIC RIGHT-SIDED LOW BACK PAIN WITH RIGHT-SIDED SCIATICA: ICD-10-CM

## 2022-10-11 DIAGNOSIS — M25.551 RIGHT HIP PAIN: ICD-10-CM

## 2022-10-11 DIAGNOSIS — M54.41 CHRONIC RIGHT-SIDED LOW BACK PAIN WITH RIGHT-SIDED SCIATICA: ICD-10-CM

## 2022-10-11 RX ORDER — HYDROCODONE BITARTRATE AND ACETAMINOPHEN 5; 325 MG/1; MG/1
1 TABLET ORAL DAILY PRN
Qty: 14 TABLET | Refills: 0 | Status: SHIPPED | OUTPATIENT
Start: 2022-10-11 | End: 2022-10-21 | Stop reason: SDUPTHER

## 2022-10-11 NOTE — TELEPHONE ENCOUNTER
Last visit: 9/29/22  Last Med refill: 9/29/22  Does patient have enough medication for 72 hours: No: 1/2 tablet left    Next Visit Date:  Future Appointments   Date Time Provider Veena Stanley   11/3/2022  1:45 PM Justino Ferguson, APRN - NP [address] Maintenance   Topic Date Due    Pneumococcal 0-64 years Vaccine (1 - PCV) Never done    Diabetic foot exam  Never done    HIV screen  Never done    Diabetic microalbuminuria test  Never done    Diabetic retinal exam  Never done    Hepatitis C screen  Never done    Hepatitis B vaccine (1 of 3 - Risk 3-dose series) Never done    COVID-19 Vaccine (2 - Vital Peter series) 12/30/2022 (Originally 5/20/2021)    Cervical cancer screen  12/30/2022 (Originally 6/15/1999)    Flu vaccine (1) 09/29/2023 (Originally 8/1/2022)    A1C test (Diabetic or Prediabetic)  12/29/2022    Lipids  09/29/2023    Depression Monitoring  09/29/2023    DTaP/Tdap/Td vaccine (4 - Td or Tdap) 04/08/2028    Hepatitis A vaccine  Aged Out    Hib vaccine  Aged Out    Meningococcal (ACWY) vaccine  Aged Out       Hemoglobin A1C (%)   Date Value   09/29/2022 13.1 (H)             ( goal A1C is < 7)   No results found for: LABMICR  LDL Cholesterol (mg/dL)   Date Value   09/29/2022 134 (H)       (goal LDL is <100)   AST (U/L)   Date Value   09/29/2022 28     ALT (U/L)   Date Value   09/29/2022 20     BUN (mg/dL)   Date Value   09/29/2022 11     BP Readings from Last 3 Encounters:   09/29/22 128/82   04/21/22 (!) 142/84   04/13/22 132/89          (goal 120/80)    All Future Testing planned in [facility]              There is no problem list on file for this patient.

## 2022-10-11 NOTE — TELEPHONE ENCOUNTER
Spoke with patient, patient has an appointment with Jenaro Roa on 11/3/22 and will bring mom with her per Katy's request to go over lab results. Patient requested a refill on Vicodin. Writer sent provider a med refill for prescription.  Patient stated [facility] clinic will be open this Thursday at 5:30 pm. Writer will call their office  [phone] and try to get medical records per Katy's request.

## 2022-10-21 DIAGNOSIS — G89.29 CHRONIC RIGHT-SIDED LOW BACK PAIN WITH RIGHT-SIDED SCIATICA: ICD-10-CM

## 2022-10-21 DIAGNOSIS — M25.551 RIGHT HIP PAIN: ICD-10-CM

## 2022-10-21 DIAGNOSIS — M54.41 CHRONIC RIGHT-SIDED LOW BACK PAIN WITH RIGHT-SIDED SCIATICA: ICD-10-CM

## 2022-10-21 RX ORDER — HYDROCODONE BITARTRATE AND ACETAMINOPHEN 5; 325 MG/1; MG/1
1 TABLET ORAL DAILY PRN
Qty: 14 TABLET | Refills: 0 | Status: SHIPPED | OUTPATIENT
Start: 2022-10-21 | End: 2022-11-03 | Stop reason: SDUPTHER

## 2022-10-21 NOTE — TELEPHONE ENCOUNTER
Last visit: 9/29/22  Last Med refill: 10/11/22  Does patient have enough medication for 72 hours: Yes    Next Visit Date:  Future Appointments   Date Time Provider Veena Stanley   11/3/2022  1:45 PM VIVIENNE Urrutia NP [address] Maintenance   Topic Date Due    Pneumococcal 0-64 years Vaccine (1 - PCV) Never done    Diabetic foot exam  Never done    HIV screen  Never done    Diabetic microalbuminuria test  Never done    Diabetic retinal exam  Never done    Hepatitis C screen  Never done    Hepatitis B vaccine (1 of 3 - Risk 3-dose series) Never done    COVID-19 Vaccine (2 - [employer] series) 12/30/2022 (Originally 5/20/2021)    Flu vaccine (1) 09/29/2023 (Originally 8/1/2022)    A1C test (Diabetic or Prediabetic)  12/29/2022    Lipids  09/29/2023    Depression Monitoring  09/29/2023    DTaP/Tdap/Td vaccine (4 - Td or Tdap) 04/08/2028    Hepatitis A vaccine  Aged Out    Hib vaccine  Aged Out    Meningococcal (ACWY) vaccine  Aged Out       Hemoglobin A1C (%)   Date Value   09/29/2022 13.1 (H)             ( goal A1C is < 7)   No results found for: LABMICR  LDL Cholesterol (mg/dL)   Date Value   09/29/2022 134 (H)       (goal LDL is <100)   AST (U/L)   Date Value   09/29/2022 28     ALT (U/L)   Date Value   09/29/2022 20     BUN (mg/dL)   Date Value   09/29/2022 11     BP Readings from Last 3 Encounters:   09/29/22 128/82   04/21/22 (!) 142/84   04/13/22 132/89          (goal 120/80)    All Future Testing planned in [facility]              There is no problem list on file for this patient.

## 2022-10-25 NOTE — TELEPHONE ENCOUNTER
Writer contacted [facility] again, spoke with Hailee Yu. Hailee Yu stated he will pass along the medical request and should receive patient records by this Friday.

## 2022-11-03 ENCOUNTER — OFFICE VISIT (OUTPATIENT)
Dept: FAMILY MEDICINE CLINIC | Age: 42
End: 2022-11-03
Payer: MEDICARE

## 2022-11-03 VITALS
OXYGEN SATURATION: 98 % | BODY MASS INDEX: 45.19 KG/M2 | DIASTOLIC BLOOD PRESSURE: 80 MMHG | SYSTOLIC BLOOD PRESSURE: 124 MMHG | WEIGHT: 280 LBS | HEART RATE: 95 BPM | TEMPERATURE: 97.7 F

## 2022-11-03 DIAGNOSIS — F41.9 ANXIETY: ICD-10-CM

## 2022-11-03 DIAGNOSIS — M25.551 RIGHT HIP PAIN: ICD-10-CM

## 2022-11-03 DIAGNOSIS — G89.29 CHRONIC RIGHT-SIDED LOW BACK PAIN WITH RIGHT-SIDED SCIATICA: ICD-10-CM

## 2022-11-03 DIAGNOSIS — Z79.4 TYPE 2 DIABETES MELLITUS WITHOUT COMPLICATION, WITH LONG-TERM CURRENT USE OF INSULIN (HCC): Primary | ICD-10-CM

## 2022-11-03 DIAGNOSIS — E11.9 TYPE 2 DIABETES MELLITUS WITHOUT COMPLICATION, WITH LONG-TERM CURRENT USE OF INSULIN (HCC): Primary | ICD-10-CM

## 2022-11-03 DIAGNOSIS — G47.00 INSOMNIA, UNSPECIFIED TYPE: ICD-10-CM

## 2022-11-03 DIAGNOSIS — B36.9 FUNGAL DERMATITIS: ICD-10-CM

## 2022-11-03 DIAGNOSIS — M54.41 CHRONIC RIGHT-SIDED LOW BACK PAIN WITH RIGHT-SIDED SCIATICA: ICD-10-CM

## 2022-11-03 PROCEDURE — 1036F TOBACCO NON-USER: CPT | Performed by: NURSE PRACTITIONER

## 2022-11-03 PROCEDURE — G8427 DOCREV CUR MEDS BY ELIG CLIN: HCPCS | Performed by: NURSE PRACTITIONER

## 2022-11-03 PROCEDURE — G8484 FLU IMMUNIZE NO ADMIN: HCPCS | Performed by: NURSE PRACTITIONER

## 2022-11-03 PROCEDURE — 99215 OFFICE O/P EST HI 40 MIN: CPT | Performed by: NURSE PRACTITIONER

## 2022-11-03 PROCEDURE — 3046F HEMOGLOBIN A1C LEVEL >9.0%: CPT | Performed by: NURSE PRACTITIONER

## 2022-11-03 PROCEDURE — 2022F DILAT RTA XM EVC RTNOPTHY: CPT | Performed by: NURSE PRACTITIONER

## 2022-11-03 PROCEDURE — G8417 CALC BMI ABV UP PARAM F/U: HCPCS | Performed by: NURSE PRACTITIONER

## 2022-11-03 RX ORDER — HYDROCODONE BITARTRATE AND ACETAMINOPHEN 5; 325 MG/1; MG/1
1 TABLET ORAL DAILY PRN
Qty: 30 TABLET | Refills: 0 | Status: SHIPPED | OUTPATIENT
Start: 2022-11-03 | End: 2022-11-24 | Stop reason: SDUPTHER

## 2022-11-03 RX ORDER — FLUCONAZOLE 150 MG/1
150 TABLET ORAL
Qty: 7 TABLET | Refills: 0 | Status: SHIPPED | OUTPATIENT
Start: 2022-11-03 | End: 2022-11-22

## 2022-11-03 RX ORDER — DULAGLUTIDE 0.75 MG/.5ML
0.75 INJECTION, SOLUTION SUBCUTANEOUS WEEKLY
Qty: 3 ADJUSTABLE DOSE PRE-FILLED PEN SYRINGE | Refills: 3 | Status: SHIPPED | OUTPATIENT
Start: 2022-11-03 | End: 2022-11-24 | Stop reason: SDUPTHER

## 2022-11-03 RX ORDER — MIRTAZAPINE 7.5 MG/1
7.5 TABLET, FILM COATED ORAL NIGHTLY
Qty: 90 TABLET | Refills: 1 | Status: SHIPPED | OUTPATIENT
Start: 2022-11-03 | End: 2023-11-03

## 2022-11-03 RX ORDER — NYSTATIN 100000 [USP'U]/G
POWDER TOPICAL
Qty: 60 G | Refills: 3 | Status: SHIPPED | OUTPATIENT
Start: 2022-11-03

## 2022-11-03 NOTE — PROGRESS NOTES
Princess Castañeda (:  1980) is a 38 y.o. female,Established patient, here for evaluation of the following chief complaint(s):  Discuss Labs         ASSESSMENT/PLAN:  1. Type 2 diabetes mellitus without complication, with long-term current use of insulin (HCC)  -     Dulaglutide (TRULICITY) 9.02 PV/9.8TA SOPN; Inject 0.75 mg into the skin once a week, Disp-3 Adjustable Dose Pre-filled Pen Syringe, R-3Normal  -     empagliflozin (JARDIANCE) 10 MG tablet; Take 1 tablet by mouth daily, Disp-90 tablet, R-1Normal  2. Chronic right-sided low back pain with right-sided sciatica  -     HYDROcodone-acetaminophen (NORCO) 5-325 MG per tablet; Take 1 tablet by mouth daily as needed for Pain for up to 30 days. , Disp-30 tablet, R-0Normal  3. Right hip pain  -     HYDROcodone-acetaminophen (NORCO) 5-325 MG per tablet; Take 1 tablet by mouth daily as needed for Pain for up to 30 days. , Disp-30 tablet, R-0Normal  4. Insomnia, unspecified type  -     mirtazapine (REMERON) 7.5 MG tablet; Take 1 tablet by mouth nightly, Disp-90 tablet, R-1Normal  5. Fungal dermatitis  -     fluconazole (DIFLUCAN) 150 MG tablet; Take 1 tablet by mouth every 72 hours for 7 doses, Disp-7 tablet, R-0Normal  -     nystatin (MYCOSTATIN) 309674 UNIT/GM powder; Apply 3 times daily. , Disp-60 g, R-3, Normal  6. Anxiety  -     mirtazapine (REMERON) 7.5 MG tablet; Take 1 tablet by mouth nightly, Disp-90 tablet, R-1Normal      Return in about 3 months (around 2/3/2023), or if symptoms worsen or fail to improve, for medication follow up. Subjective   SUBJECTIVE/OBJECTIVE:  Presents to office today for follow up to recent lab testing. Mother is present as requested. Reviewed labs with patient and her mother. Explained her DM is very uncontrolled. Mother reports her diet is poor and she drinks regular pop all day. Does not drink water. Patient does agree with this.  Spent a significant amount of time discussing diet, water consumption, weight reduction and exercise with patient and Mother. Exercise is difficult for patient as she has low back pain and uses a cane for ambulation. Also spent a lot of time discussing the significant fungal infections in all of her skin folds and are likely a result of her uncontrolled glucose levels. We discussed a longer course of diflucan as well as proper skin care and hygiene. Also instructed to no longer use any kind of lotion or cream in skin folds. Mother reports she has been using body lotion on the rash areas. Verbalized understanding. Medication management was discussed at length. She is only using lantus insulin once nightly at this time. Will add trulicity and jardiance as well. Reviewed previous records obtained from the [facility] she was attending. Ezequiel Looney was convinced she was told by another provider that she had cancer \"down there\". Records reviewed do NOT indicate cancer. I am unsure where this is coming from. I explained to her and her Mother that records I have reviewed do not indicate this. Both were relieved and verbalized understanding. Mother reports she is concerned about Boogie Stevenk increased anxiety as well as insomnia. Reports Ezequiel Looney does not sleep at all. Ezequiel Looney confirms she is up at night often worrying about things and can not go to sleep. She also reports the pain in her back also prevents her from sleeping. Does report the Norco helps take the edge off. Discussed trial of Remeron. Is agreeable. Review of Systems   Constitutional:  Negative for activity change, appetite change, fatigue and fever. HENT:  Negative for congestion, rhinorrhea and sore throat. Eyes:  Negative for visual disturbance. Wears glasses   Respiratory:  Negative for cough and shortness of breath. Cardiovascular:  Negative for chest pain and palpitations. Gastrointestinal:  Negative for abdominal distention, constipation, diarrhea, nausea and vomiting. Endocrine: Negative for polydipsia, polyphagia and polyuria. DM   Genitourinary:  Positive for vaginal discharge. Negative for difficulty urinating, dysuria, frequency and urgency. Musculoskeletal:  Positive for arthralgias, back pain and gait problem. Cane for ambulation   Skin:  Positive for rash (under breasts, groin skin folds). Allergic/Immunologic: Negative for immunocompromised state. Several medication allergies; see list   Neurological:  Negative for syncope, light-headedness and headaches. MRDD   Psychiatric/Behavioral:  Negative for decreased concentration, dysphoric mood, sleep disturbance and suicidal ideas. The patient is not nervous/anxious. Objective   Physical Exam  Vitals and nursing note reviewed. Constitutional:       General: She is not in acute distress. Appearance: She is not ill-appearing. HENT:      Head: Normocephalic. Nose: Nose normal.      Mouth/Throat:      Lips: Pink. Pulmonary:      Effort: Pulmonary effort is normal. No respiratory distress. Skin:     Findings: Rash (significant fungal dermititis in every skin fold) present. Neurological:      Mental Status: She is alert and oriented to person, place, and time. Psychiatric:         Attention and Perception: Attention normal.         Speech: Speech normal.         Behavior: Behavior is cooperative. An electronic signature was used to authenticate this note.     --Lucie Smith, APRN - NP

## 2022-11-14 ASSESSMENT — ENCOUNTER SYMPTOMS
VOMITING: 0
ABDOMINAL DISTENTION: 0
DIARRHEA: 0
BACK PAIN: 1
COUGH: 0
NAUSEA: 0
SHORTNESS OF BREATH: 0
CONSTIPATION: 0
SORE THROAT: 0
RHINORRHEA: 0

## 2022-11-23 DIAGNOSIS — G89.29 CHRONIC RIGHT-SIDED LOW BACK PAIN WITH RIGHT-SIDED SCIATICA: ICD-10-CM

## 2022-11-23 DIAGNOSIS — M54.41 CHRONIC RIGHT-SIDED LOW BACK PAIN WITH RIGHT-SIDED SCIATICA: ICD-10-CM

## 2022-11-23 DIAGNOSIS — M25.551 RIGHT HIP PAIN: ICD-10-CM

## 2022-11-23 RX ORDER — HYDROCODONE BITARTRATE AND ACETAMINOPHEN 5; 325 MG/1; MG/1
1 TABLET ORAL DAILY PRN
Qty: 30 TABLET | Refills: 0 | OUTPATIENT
Start: 2022-11-23 | End: 2022-12-23

## 2022-11-23 NOTE — TELEPHONE ENCOUNTER
LOV 11/3/22  LRF 11/3/22    Next appt 2/3/23      Health Maintenance   Topic Date Due    Diabetic foot exam  Never done    HIV screen  Never done    Diabetic microalbuminuria test  Never done    Diabetic retinal exam  Never done    Hepatitis C screen  Never done    COVID-19 Vaccine (2 - [employer] series) 12/30/2022 (Originally 5/20/2021)    Flu vaccine (1) 09/29/2023 (Originally 8/1/2022)    Hepatitis B vaccine (1 of 3 - Risk 3-dose series) 11/03/2023 (Originally 6/15/1997)    Pneumococcal 0-64 years Vaccine (1 - PCV) 11/03/2023 (Originally 6/15/1984)    A1C test (Diabetic or Prediabetic)  12/29/2022    Lipids  09/29/2023    Depression Monitoring  09/29/2023    DTaP/Tdap/Td vaccine (4 - Td or Tdap) 04/08/2028    Hepatitis A vaccine  Aged Out    Hib vaccine  Aged Out    Meningococcal (ACWY) vaccine  Aged Out             (applicable per patient's age: Cancer Screenings, Depression Screening, Fall Risk Screening, Immunizations)    Hemoglobin A1C (%)   Date Value   09/29/2022 13.1 (H)     LDL Cholesterol (mg/dL)   Date Value   09/29/2022 134 (H)     AST (U/L)   Date Value   09/29/2022 28     ALT (U/L)   Date Value   09/29/2022 20     BUN (mg/dL)   Date Value   09/29/2022 11      (goal A1C is < 7)   (goal LDL is <100) need 30-50% reduction from baseline     BP Readings from Last 3 Encounters:   11/03/22 124/80   09/29/22 128/82   04/21/22 (!) 142/84    (goal /80)      All Future Testing planned in [facility]:      Next Visit Date:  Future Appointments   Date Time Provider Veena Stanley   2/3/2023  1:45 PM VIVIENNE Novoa NP [facility]            There is no problem list on file for this patient.

## 2022-11-23 NOTE — TELEPHONE ENCOUNTER
----- Message from [facility] sent at 11/23/2022  8:52 AM EST -----  Subject: Message to Provider    QUESTIONS  Information for Provider? Patient calling to check the status of her   medication refill request for her HYDROcodone-acetaminophen (NORCO) 5-325   MG per tablet. Patient stated that she needs this medication and that she   takes it every day. ECC tried to call the practice several times and kept   getting a busy/fax signal for the mainline and then disconnects. Please   advise.   ---------------------------------------------------------------------------  --------------  Biju Mora [facility]  [phone]; OK to leave message on voicemail  ---------------------------------------------------------------------------  --------------  SCRIPT ANSWERS  Relationship to Patient?  Self

## 2022-11-24 DIAGNOSIS — G89.29 CHRONIC RIGHT-SIDED LOW BACK PAIN WITH RIGHT-SIDED SCIATICA: ICD-10-CM

## 2022-11-24 DIAGNOSIS — M54.41 CHRONIC RIGHT-SIDED LOW BACK PAIN WITH RIGHT-SIDED SCIATICA: ICD-10-CM

## 2022-11-24 DIAGNOSIS — Z79.4 TYPE 2 DIABETES MELLITUS WITHOUT COMPLICATION, WITH LONG-TERM CURRENT USE OF INSULIN (HCC): ICD-10-CM

## 2022-11-24 DIAGNOSIS — M25.551 RIGHT HIP PAIN: ICD-10-CM

## 2022-11-24 DIAGNOSIS — E11.9 TYPE 2 DIABETES MELLITUS WITHOUT COMPLICATION, WITH LONG-TERM CURRENT USE OF INSULIN (HCC): ICD-10-CM

## 2022-11-25 RX ORDER — HYDROCODONE BITARTRATE AND ACETAMINOPHEN 5; 325 MG/1; MG/1
1 TABLET ORAL DAILY PRN
Qty: 4 TABLET | Refills: 0 | Status: SHIPPED | OUTPATIENT
Start: 2022-11-25 | End: 2022-11-28 | Stop reason: SDUPTHER

## 2022-11-25 NOTE — TELEPHONE ENCOUNTER
Last visit: 11/3/22  Last Med refill: 11/3/22  Does patient have enough medication for 72 hours: Yes    Next Visit Date:  Future Appointments   Date Time Provider Veena Stanley   2/3/2023  1:45 PM VIVIENNE Clark [facility] [address] [address] Maintenance   Topic Date Due    Diabetic foot exam  Never done    HIV screen  Never done    Diabetic microalbuminuria test  Never done    Diabetic retinal exam  Never done    Hepatitis C screen  Never done    COVID-19 Vaccine (2 - [employer] series) 12/30/2022 (Originally 5/20/2021)    Flu vaccine (1) 09/29/2023 (Originally 8/1/2022)    Hepatitis B vaccine (1 of 3 - Risk 3-dose series) 11/03/2023 (Originally 6/15/1997)    Pneumococcal 0-64 years Vaccine (1 - PCV) 11/03/2023 (Originally 6/15/1984)    A1C test (Diabetic or Prediabetic)  12/29/2022    Lipids  09/29/2023    Depression Monitoring  09/29/2023    DTaP/Tdap/Td vaccine (4 - Td or Tdap) 04/08/2028    Hepatitis A vaccine  Aged Out    Hib vaccine  Aged Out    Meningococcal (ACWY) vaccine  Aged Out       Hemoglobin A1C (%)   Date Value   09/29/2022 13.1 (H)             ( goal A1C is < 7)   No results found for: LABMICR  LDL Cholesterol (mg/dL)   Date Value   09/29/2022 134 (H)       (goal LDL is <100)   AST (U/L)   Date Value   09/29/2022 28     ALT (U/L)   Date Value   09/29/2022 20     BUN (mg/dL)   Date Value   09/29/2022 11     BP Readings from Last 3 Encounters:   11/03/22 124/80   09/29/22 128/82   04/21/22 (!) 142/84          (goal 120/80)    All Future Testing planned in [facility]              There is no problem list on file for this patient.

## 2022-11-25 NOTE — TELEPHONE ENCOUNTER
Patient needs to reach out to PCP for further management. Her last script was for 30 tablets, and to only use once daily as needed, this was filled on 11/4.  4 tablets sent until PCP is back in office on Tuesday. Please update patient/mom.

## 2022-11-25 NOTE — TELEPHONE ENCOUNTER
Last visit: 11/3/22  Last Med refill: 11/3/22  Does patient have enough medication for 72 hours: No: pt states she will be out    Next Visit Date:  Future Appointments   Date Time Provider Veena Stanley   2/3/2023  1:45 PM VIVIENNE Gautam - [facility] [address] Maintenance   Topic Date Due    Diabetic foot exam  Never done    HIV screen  Never done    Diabetic microalbuminuria test  Never done    Diabetic retinal exam  Never done    Hepatitis C screen  Never done    COVID-19 Vaccine (2 - [employer] series) 12/30/2022 (Originally 5/20/2021)    Flu vaccine (1) 09/29/2023 (Originally 8/1/2022)    Hepatitis B vaccine (1 of 3 - Risk 3-dose series) 11/03/2023 (Originally 6/15/1997)    Pneumococcal 0-64 years Vaccine (1 - PCV) 11/03/2023 (Originally 6/15/1984)    A1C test (Diabetic or Prediabetic)  12/29/2022    Lipids  09/29/2023    Depression Monitoring  09/29/2023    DTaP/Tdap/Td vaccine (4 - Td or Tdap) 04/08/2028    Hepatitis A vaccine  Aged Out    Hib vaccine  Aged Out    Meningococcal (ACWY) vaccine  Aged Out       Hemoglobin A1C (%)   Date Value   09/29/2022 13.1 (H)             ( goal A1C is < 7)   No results found for: LABMICR  LDL Cholesterol (mg/dL)   Date Value   09/29/2022 134 (H)       (goal LDL is <100)   AST (U/L)   Date Value   09/29/2022 28     ALT (U/L)   Date Value   09/29/2022 20     BUN (mg/dL)   Date Value   09/29/2022 11     BP Readings from Last 3 Encounters:   11/03/22 124/80   09/29/22 128/82   04/21/22 (!) 142/84          (goal 120/80)    All Future Testing planned in [facility]              There is no problem list on file for this patient.

## 2022-11-27 RX ORDER — DULAGLUTIDE 0.75 MG/.5ML
0.75 INJECTION, SOLUTION SUBCUTANEOUS WEEKLY
Qty: 4 ADJUSTABLE DOSE PRE-FILLED PEN SYRINGE | Refills: 0 | Status: SHIPPED | OUTPATIENT
Start: 2022-11-27 | End: 2023-11-27

## 2022-11-28 ENCOUNTER — TELEPHONE (OUTPATIENT)
Dept: FAMILY MEDICINE CLINIC | Age: 42
End: 2022-11-28

## 2022-11-28 DIAGNOSIS — M54.41 CHRONIC RIGHT-SIDED LOW BACK PAIN WITH RIGHT-SIDED SCIATICA: ICD-10-CM

## 2022-11-28 DIAGNOSIS — G89.29 CHRONIC RIGHT-SIDED LOW BACK PAIN WITH RIGHT-SIDED SCIATICA: ICD-10-CM

## 2022-11-28 DIAGNOSIS — M25.551 RIGHT HIP PAIN: ICD-10-CM

## 2022-11-28 RX ORDER — HYDROCODONE BITARTRATE AND ACETAMINOPHEN 5; 325 MG/1; MG/1
1 TABLET ORAL DAILY PRN
Qty: 4 TABLET | Refills: 0 | OUTPATIENT
Start: 2022-11-28 | End: 2022-12-02

## 2022-11-28 NOTE — TELEPHONE ENCOUNTER
LOV 11/3/22  LRF 11/25/22    Next appt 2/3/23      Health Maintenance   Topic Date Due    Diabetic foot exam  Never done    HIV screen  Never done    Diabetic microalbuminuria test  Never done    Diabetic retinal exam  Never done    Hepatitis C screen  Never done    COVID-19 Vaccine (2 - [employer] series) 12/30/2022 (Originally 5/20/2021)    Flu vaccine (1) 09/29/2023 (Originally 8/1/2022)    Hepatitis B vaccine (1 of 3 - Risk 3-dose series) 11/03/2023 (Originally 6/15/1997)    Pneumococcal 0-64 years Vaccine (1 - PCV) 11/03/2023 (Originally 6/15/1984)    A1C test (Diabetic or Prediabetic)  12/29/2022    Lipids  09/29/2023    Depression Monitoring  09/29/2023    DTaP/Tdap/Td vaccine (4 - Td or Tdap) 04/08/2028    Hepatitis A vaccine  Aged Out    Hib vaccine  Aged Out    Meningococcal (ACWY) vaccine  Aged Out             (applicable per patient's age: Cancer Screenings, Depression Screening, Fall Risk Screening, Immunizations)    Hemoglobin A1C (%)   Date Value   09/29/2022 13.1 (H)     LDL Cholesterol (mg/dL)   Date Value   09/29/2022 134 (H)     AST (U/L)   Date Value   09/29/2022 28     ALT (U/L)   Date Value   09/29/2022 20     BUN (mg/dL)   Date Value   09/29/2022 11      (goal A1C is < 7)   (goal LDL is <100) need 30-50% reduction from baseline     BP Readings from Last 3 Encounters:   11/03/22 124/80   09/29/22 128/82   04/21/22 (!) 142/84    (goal /80)      All Future Testing planned in [facility]:      Next Visit Date:  Future Appointments   Date Time Provider Veena Stanley   2/3/2023  1:45 PM VIVIENNE Narayan NP [facility]            There is no problem list on file for this patient.

## 2022-11-28 NOTE — TELEPHONE ENCOUNTER
----- Message from Joshua Bains sent at 11/28/2022 12:01 PM EST -----  Subject: Refill Request    QUESTIONS  Name of Medication? HYDROcodone-acetaminophen (NORCO) 5-325 MG per tablet  Patient-reported dosage and instructions? 5-325 mg per tablet 1 tablet   every 6 hours  How many days do you have left? 1  Preferred Pharmacy? [facility] [id number]  Pharmacy phone number (if available)? [phone]  Additional Information for Provider? Patient is almost out of medication. Please call when sent to pharmacy.  ---------------------------------------------------------------------------  --------------  [address]  What is the best way for the office to contact you? OK to leave message on   voicemail  Preferred Call Back Phone Number? [phone]  ---------------------------------------------------------------------------  --------------  SCRIPT ANSWERS  Relationship to Patient?  Self

## 2022-11-29 RX ORDER — HYDROCODONE BITARTRATE AND ACETAMINOPHEN 5; 325 MG/1; MG/1
1 TABLET ORAL DAILY PRN
Qty: 14 TABLET | Refills: 0 | Status: SHIPPED | OUTPATIENT
Start: 2022-11-29 | End: 2022-12-13

## 2022-11-30 ENCOUNTER — TELEPHONE (OUTPATIENT)
Dept: FAMILY MEDICINE CLINIC | Age: 42
End: 2022-11-30

## 2022-11-30 DIAGNOSIS — M25.551 RIGHT HIP PAIN: ICD-10-CM

## 2022-11-30 DIAGNOSIS — M54.41 CHRONIC RIGHT-SIDED LOW BACK PAIN WITH RIGHT-SIDED SCIATICA: ICD-10-CM

## 2022-11-30 DIAGNOSIS — G89.29 CHRONIC RIGHT-SIDED LOW BACK PAIN WITH RIGHT-SIDED SCIATICA: ICD-10-CM

## 2022-11-30 RX ORDER — HYDROCODONE BITARTRATE AND ACETAMINOPHEN 5; 325 MG/1; MG/1
1 TABLET ORAL DAILY PRN
Qty: 14 TABLET | Refills: 0 | OUTPATIENT
Start: 2022-11-30 | End: 2022-12-14

## 2022-11-30 NOTE — TELEPHONE ENCOUNTER
----- Message from Nayla Machmendez sent at 11/28/2022 11:59 AM EST -----  Subject: Message to Provider    QUESTIONS  Information for Provider? Patient calling to update pcp Thompson Ty   that she was diagnosed with RSV at urgent care on Friday. Please call to   discuss. ---------------------------------------------------------------------------  --------------  Greg Eldridge [facility]  [phone]; OK to leave message on voicemail  ---------------------------------------------------------------------------  --------------  SCRIPT ANSWERS  Relationship to Patient?  Self

## 2022-11-30 NOTE — TELEPHONE ENCOUNTER
Spoke with pharmacy pt wont be able to fill medication for another 3 more days. Pt states she only has 1 1/2 tablets left.

## 2022-12-08 DIAGNOSIS — M25.551 RIGHT HIP PAIN: ICD-10-CM

## 2022-12-08 DIAGNOSIS — M54.41 CHRONIC RIGHT-SIDED LOW BACK PAIN WITH RIGHT-SIDED SCIATICA: ICD-10-CM

## 2022-12-08 DIAGNOSIS — G89.29 CHRONIC RIGHT-SIDED LOW BACK PAIN WITH RIGHT-SIDED SCIATICA: ICD-10-CM

## 2022-12-08 RX ORDER — HYDROCODONE BITARTRATE AND ACETAMINOPHEN 5; 325 MG/1; MG/1
1 TABLET ORAL DAILY PRN
Qty: 14 TABLET | Refills: 0 | OUTPATIENT
Start: 2022-12-08 | End: 2022-12-22

## 2022-12-08 NOTE — TELEPHONE ENCOUNTER
LOV 11/3/22  LRF 11/29/22    Next appt 2/3/23      Health Maintenance   Topic Date Due    Diabetic foot exam  Never done    HIV screen  Never done    Diabetic microalbuminuria test  Never done    Diabetic retinal exam  Never done    Hepatitis C screen  Never done    A1C test (Diabetic or Prediabetic)  12/29/2022    COVID-19 Vaccine (2 - [employer] series) 12/30/2022 (Originally 5/20/2021)    Flu vaccine (1) 09/29/2023 (Originally 8/1/2022)    Hepatitis B vaccine (1 of 3 - Risk 3-dose series) 11/03/2023 (Originally 6/15/1997)    Pneumococcal 0-64 years Vaccine (1 - PCV) 11/03/2023 (Originally 6/15/1984)    Lipids  09/29/2023    Depression Monitoring  09/29/2023    DTaP/Tdap/Td vaccine (4 - Td or Tdap) 04/08/2028    Hepatitis A vaccine  Aged Out    Hib vaccine  Aged Out    Meningococcal (ACWY) vaccine  Aged Out             (applicable per patient's age: Cancer Screenings, Depression Screening, Fall Risk Screening, Immunizations)    Hemoglobin A1C (%)   Date Value   09/29/2022 13.1 (H)     LDL Cholesterol (mg/dL)   Date Value   09/29/2022 134 (H)     AST (U/L)   Date Value   09/29/2022 28     ALT (U/L)   Date Value   09/29/2022 20     BUN (mg/dL)   Date Value   09/29/2022 11      (goal A1C is < 7)   (goal LDL is <100) need 30-50% reduction from baseline     BP Readings from Last 3 Encounters:   11/03/22 124/80   09/29/22 128/82   04/21/22 (!) 142/84    (goal /80)      All Future Testing planned in [facility]:      Next Visit Date:  Future Appointments   Date Time Provider Veena Stanley   2/3/2023  1:45 PM VIVIENNE Mackey NP [facility]            There is no problem list on file for this patient.

## 2022-12-09 DIAGNOSIS — F33.0 MILD EPISODE OF RECURRENT MAJOR DEPRESSIVE DISORDER (HCC): ICD-10-CM

## 2022-12-09 DIAGNOSIS — M25.551 RIGHT HIP PAIN: ICD-10-CM

## 2022-12-09 DIAGNOSIS — F41.9 ANXIETY: ICD-10-CM

## 2022-12-09 DIAGNOSIS — M54.41 CHRONIC RIGHT-SIDED LOW BACK PAIN WITH RIGHT-SIDED SCIATICA: ICD-10-CM

## 2022-12-09 DIAGNOSIS — G89.29 CHRONIC RIGHT-SIDED LOW BACK PAIN WITH RIGHT-SIDED SCIATICA: ICD-10-CM

## 2022-12-09 RX ORDER — HYDROCODONE BITARTRATE AND ACETAMINOPHEN 5; 325 MG/1; MG/1
1 TABLET ORAL DAILY PRN
Qty: 14 TABLET | Refills: 0 | OUTPATIENT
Start: 2022-12-09 | End: 2022-12-23

## 2022-12-09 RX ORDER — CITALOPRAM 20 MG/1
20 TABLET ORAL DAILY
Qty: 30 TABLET | Refills: 2 | Status: SHIPPED | OUTPATIENT
Start: 2022-12-09 | End: 2023-12-09

## 2022-12-09 NOTE — TELEPHONE ENCOUNTER
LOV 11/3/22  LRF 10/10/22    Next appt 2/3/22      Health Maintenance   Topic Date Due    Diabetic foot exam  Never done    HIV screen  Never done    Diabetic microalbuminuria test  Never done    Diabetic retinal exam  Never done    Hepatitis C screen  Never done    A1C test (Diabetic or Prediabetic)  12/29/2022    COVID-19 Vaccine (2 - [employer] series) 12/30/2022 (Originally 5/20/2021)    Flu vaccine (1) 09/29/2023 (Originally 8/1/2022)    Hepatitis B vaccine (1 of 3 - Risk 3-dose series) 11/03/2023 (Originally 6/15/1997)    Pneumococcal 0-64 years Vaccine (1 - PCV) 11/03/2023 (Originally 6/15/1984)    Lipids  09/29/2023    Depression Monitoring  09/29/2023    DTaP/Tdap/Td vaccine (4 - Td or Tdap) 04/08/2028    Hepatitis A vaccine  Aged Out    Hib vaccine  Aged Out    Meningococcal (ACWY) vaccine  Aged Out             (applicable per patient's age: Cancer Screenings, Depression Screening, Fall Risk Screening, Immunizations)    Hemoglobin A1C (%)   Date Value   09/29/2022 13.1 (H)     LDL Cholesterol (mg/dL)   Date Value   09/29/2022 134 (H)     AST (U/L)   Date Value   09/29/2022 28     ALT (U/L)   Date Value   09/29/2022 20     BUN (mg/dL)   Date Value   09/29/2022 11      (goal A1C is < 7)   (goal LDL is <100) need 30-50% reduction from baseline     BP Readings from Last 3 Encounters:   11/03/22 124/80   09/29/22 128/82   04/21/22 (!) 142/84    (goal /80)      All Future Testing planned in [facility]:      Next Visit Date:  Future Appointments   Date Time Provider Veena Stanley   2/3/2023  1:45 PM [facility]            There is no problem list on file for this patient.

## 2022-12-12 DIAGNOSIS — M54.41 CHRONIC RIGHT-SIDED LOW BACK PAIN WITH RIGHT-SIDED SCIATICA: ICD-10-CM

## 2022-12-12 DIAGNOSIS — M25.551 RIGHT HIP PAIN: ICD-10-CM

## 2022-12-12 DIAGNOSIS — G89.29 CHRONIC RIGHT-SIDED LOW BACK PAIN WITH RIGHT-SIDED SCIATICA: ICD-10-CM

## 2022-12-12 RX ORDER — HYDROCODONE BITARTRATE AND ACETAMINOPHEN 5; 325 MG/1; MG/1
1 TABLET ORAL DAILY PRN
Qty: 14 TABLET | Refills: 0 | Status: SHIPPED | OUTPATIENT
Start: 2022-12-12 | End: 2022-12-26

## 2022-12-13 RX ORDER — HYDROCODONE BITARTRATE AND ACETAMINOPHEN 5; 325 MG/1; MG/1
1 TABLET ORAL DAILY PRN
Qty: 14 TABLET | Refills: 0 | OUTPATIENT
Start: 2022-12-13 | End: 2022-12-27

## 2022-12-19 DIAGNOSIS — R11.0 NAUSEA: ICD-10-CM

## 2022-12-20 DIAGNOSIS — G89.29 CHRONIC RIGHT-SIDED LOW BACK PAIN WITH RIGHT-SIDED SCIATICA: ICD-10-CM

## 2022-12-20 DIAGNOSIS — M25.551 RIGHT HIP PAIN: ICD-10-CM

## 2022-12-20 DIAGNOSIS — M54.41 CHRONIC RIGHT-SIDED LOW BACK PAIN WITH RIGHT-SIDED SCIATICA: ICD-10-CM

## 2022-12-20 RX ORDER — HYDROCODONE BITARTRATE AND ACETAMINOPHEN 5; 325 MG/1; MG/1
1 TABLET ORAL DAILY PRN
Qty: 14 TABLET | Refills: 0 | Status: SHIPPED | OUTPATIENT
Start: 2022-12-20 | End: 2023-01-03

## 2022-12-20 RX ORDER — ONDANSETRON 4 MG/1
4 TABLET, ORALLY DISINTEGRATING ORAL 3 TIMES DAILY PRN
Qty: 21 TABLET | Refills: 0 | Status: SHIPPED | OUTPATIENT
Start: 2022-12-20

## 2022-12-20 NOTE — TELEPHONE ENCOUNTER
LOV 11/3/22  LRF 12/12/22    Next appt 2/3/22      Health Maintenance   Topic Date Due    Diabetic foot exam  Never done    HIV screen  Never done    Diabetic microalbuminuria test  Never done    Diabetic retinal exam  Never done    Hepatitis C screen  Never done    A1C test (Diabetic or Prediabetic)  12/29/2022    COVID-19 Vaccine (2 - [employer] series) 12/30/2022 (Originally 5/20/2021)    Flu vaccine (1) 09/29/2023 (Originally 8/1/2022)    Hepatitis B vaccine (1 of 3 - Risk 3-dose series) 11/03/2023 (Originally 6/15/1997)    Pneumococcal 0-64 years Vaccine (1 - PCV) 11/03/2023 (Originally 6/15/1984)    Lipids  09/29/2023    Depression Monitoring  09/29/2023    DTaP/Tdap/Td vaccine (4 - Td or Tdap) 04/08/2028    Hepatitis A vaccine  Aged Out    Hib vaccine  Aged Out    Meningococcal (ACWY) vaccine  Aged Out             (applicable per patient's age: Cancer Screenings, Depression Screening, Fall Risk Screening, Immunizations)    Hemoglobin A1C (%)   Date Value   09/29/2022 13.1 (H)     LDL Cholesterol (mg/dL)   Date Value   09/29/2022 134 (H)     AST (U/L)   Date Value   09/29/2022 28     ALT (U/L)   Date Value   09/29/2022 20     BUN (mg/dL)   Date Value   09/29/2022 11      (goal A1C is < 7)   (goal LDL is <100) need 30-50% reduction from baseline     BP Readings from Last 3 Encounters:   11/03/22 124/80   09/29/22 128/82   04/21/22 (!) 142/84    (goal /80)      All Future Testing planned in [facility]:      Next Visit Date:  Future Appointments   Date Time Provider Veena Stanley   2/3/2023  1:45 PM Coleen Cough, APRN - NP [facility]            There is no problem list on file for this patient.

## 2022-12-26 DIAGNOSIS — I10 PRIMARY HYPERTENSION: ICD-10-CM

## 2022-12-27 RX ORDER — LISINOPRIL AND HYDROCHLOROTHIAZIDE 25; 20 MG/1; MG/1
1 TABLET ORAL DAILY
Qty: 90 TABLET | Refills: 1 | OUTPATIENT
Start: 2022-12-27 | End: 2023-12-27

## 2023-01-03 DIAGNOSIS — M54.41 CHRONIC RIGHT-SIDED LOW BACK PAIN WITH RIGHT-SIDED SCIATICA: ICD-10-CM

## 2023-01-03 DIAGNOSIS — E11.9 TYPE 2 DIABETES MELLITUS WITHOUT COMPLICATION, WITH LONG-TERM CURRENT USE OF INSULIN (HCC): ICD-10-CM

## 2023-01-03 DIAGNOSIS — M25.551 RIGHT HIP PAIN: ICD-10-CM

## 2023-01-03 DIAGNOSIS — G89.29 CHRONIC RIGHT-SIDED LOW BACK PAIN WITH RIGHT-SIDED SCIATICA: ICD-10-CM

## 2023-01-03 DIAGNOSIS — Z79.4 TYPE 2 DIABETES MELLITUS WITHOUT COMPLICATION, WITH LONG-TERM CURRENT USE OF INSULIN (HCC): ICD-10-CM

## 2023-01-03 RX ORDER — HYDROCODONE BITARTRATE AND ACETAMINOPHEN 5; 325 MG/1; MG/1
1 TABLET ORAL DAILY PRN
Qty: 14 TABLET | Refills: 0 | OUTPATIENT
Start: 2023-01-03 | End: 2023-01-17

## 2023-01-04 RX ORDER — HYDROCODONE BITARTRATE AND ACETAMINOPHEN 5; 325 MG/1; MG/1
1 TABLET ORAL DAILY PRN
Qty: 14 TABLET | Refills: 0 | Status: SHIPPED | OUTPATIENT
Start: 2023-01-04 | End: 2023-01-06 | Stop reason: ALTCHOICE

## 2023-01-04 RX ORDER — DULAGLUTIDE 0.75 MG/.5ML
0.75 INJECTION, SOLUTION SUBCUTANEOUS WEEKLY
Qty: 4 ADJUSTABLE DOSE PRE-FILLED PEN SYRINGE | Refills: 0 | Status: SHIPPED
Start: 2023-01-04 | End: 2023-01-06 | Stop reason: DRUGHIGH

## 2023-01-04 NOTE — TELEPHONE ENCOUNTER
LOV 11/3/22  LRF 12/20/22     Next appt 2/3/23      Health Maintenance   Topic Date Due    Diabetic foot exam  Never done    HIV screen  Never done    Diabetic Alb to Cr ratio (uACR) test  Never done    Diabetic retinal exam  Never done    Hepatitis C screen  Never done    COVID-19 Vaccine (2 - Pf[employer] series) 05/20/2021    A1C test (Diabetic or Prediabetic)  12/29/2022    Flu vaccine (1) 09/29/2023 (Originally 8/1/2022)    Hepatitis B vaccine (1 of 3 - Risk 3-dose series) 11/03/2023 (Originally 6/15/1997)    Pneumococcal 0-64 years Vaccine (1 - PCV) 11/03/2023 (Originally 6/15/1984)    Lipids  09/29/2023    Depression Monitoring  09/29/2023    GFR test (Diabetes, CKD 3-4, OR last GFR 15-59)  09/29/2023    DTaP/Tdap/Td vaccine (4 - Td or Tdap) 04/08/2028    Hepatitis A vaccine  Aged Out    Hib vaccine  Aged Out    Meningococcal (ACWY) vaccine  Aged Out             (applicable per patient's age: Cancer Screenings, Depression Screening, Fall Risk Screening, Immunizations)    Hemoglobin A1C (%)   Date Value   09/29/2022 13.1 (H)     LDL Cholesterol (mg/dL)   Date Value   09/29/2022 134 (H)     AST (U/L)   Date Value   09/29/2022 28     ALT (U/L)   Date Value   09/29/2022 20     BUN (mg/dL)   Date Value   09/29/2022 11      (goal A1C is < 7)   (goal LDL is <100) need 30-50% reduction from baseline     BP Readings from Last 3 Encounters:   11/03/22 124/80   09/29/22 128/82   04/21/22 (!) 142/84    (goal /80)      All Future Testing planned in [facility]:      Next Visit Date:  Future Appointments   Date Time Provider Veena Stanley   2/3/2023  1:45 PM VIVIENNE Rodríguez - [facility]            There is no problem list on file for this patient.

## 2023-01-04 NOTE — TELEPHONE ENCOUNTER
LOV 11/3/22  LRF 11/27/22    Next appt 2/3/23      Health Maintenance   Topic Date Due    Diabetic foot exam  Never done    HIV screen  Never done    Diabetic Alb to Cr ratio (uACR) test  Never done    Diabetic retinal exam  Never done    Hepatitis C screen  Never done    COVID-19 Vaccine (2 - Pfizer series) 05/20/2021    A1C test (Diabetic or Prediabetic)  12/29/2022    Flu vaccine (1) 09/29/2023 (Originally 8/1/2022)    Hepatitis B vaccine (1 of 3 - Risk 3-dose series) 11/03/2023 (Originally 6/15/1997)    Pneumococcal 0-64 years Vaccine (1 - PCV) 11/03/2023 (Originally 6/15/1984)    Lipids  09/29/2023    Depression Monitoring  09/29/2023    GFR test (Diabetes, CKD 3-4, OR last GFR 15-59)  09/29/2023    DTaP/Tdap/Td vaccine (4 - Td or Tdap) 04/08/2028    Hepatitis A vaccine  Aged Out    Hib vaccine  Aged Out    Meningococcal (ACWY) vaccine  Aged Out             (applicable per patient's age: Cancer Screenings, Depression Screening, Fall Risk Screening, Immunizations)    Hemoglobin A1C (%)   Date Value   09/29/2022 13.1 (H)     LDL Cholesterol (mg/dL)   Date Value   09/29/2022 134 (H)     AST (U/L)   Date Value   09/29/2022 28     ALT (U/L)   Date Value   09/29/2022 20     BUN (mg/dL)   Date Value   09/29/2022 11      (goal A1C is < 7)   (goal LDL is <100) need 30-50% reduction from baseline     BP Readings from Last 3 Encounters:   11/03/22 124/80   09/29/22 128/82   04/21/22 (!) 142/84    (goal /80)      All Future Testing planned in [facility]:      Next Visit Date:  Future Appointments   Date Time Provider Veena Stanley   2/3/2023  1:45 PM VIVIENNE Renee NP [facility]            There is no problem list on file for this patient.

## 2023-01-05 ENCOUNTER — NURSE TRIAGE (OUTPATIENT)
Dept: OTHER | Facility: CLINIC | Age: 43
End: 2023-01-05

## 2023-01-05 NOTE — TELEPHONE ENCOUNTER
Location of patient: [address] call from [place] at [facility] with [employer]. Subjective: Caller states \"It started a couple months ago, the doctor knows about it and prescribed me Vicodin and it's not working now. Requesting OxyContin. \"    Current Symptoms: Right hip that radiates to right lower abdomen; mild nausea    Denies: change in bowel habits, vomiting, or bleeding; also noted not taking Jardiance due to stomach upset    BS this morning - 285    Onset: a few months ago; sudden, worsening    Associated Symptoms: reduced activity, increased wakefulness, constipation    Pain Severity: 8/10; stabbing; intermittent    Temperature: 104.4 orally this morning    What has been tried: Vicodin at 1000 today    LMP: NA Pregnant:  hysterectomy    Recommended disposition: Go to ED/UCC Now (Or to Office with PCP Approval)    Care advice provided, patient verbalizes understanding; denies any other questions or concerns; instructed to call back for any new or worsening symptoms. Writer provided warm transfer to [address] [place] [address] for 2nd level triage/provider disposition. Attention Provider: Thank you for allowing me to participate in the care of your patient. The patient was connected to triage in response to information provided to the ECC/PSC. Please do not respond through this encounter as the response is not directed to a shared pool.       Reason for Disposition   Patient sounds very sick or weak to the triager    Protocols used: Hip Pain-ADULT-[place]

## 2023-01-06 ENCOUNTER — OFFICE VISIT (OUTPATIENT)
Dept: FAMILY MEDICINE CLINIC | Age: 43
End: 2023-01-06
Payer: COMMERCIAL

## 2023-01-06 ENCOUNTER — HOSPITAL ENCOUNTER (OUTPATIENT)
Age: 43
Setting detail: SPECIMEN
Discharge: HOME | End: 2023-01-06

## 2023-01-06 VITALS
DIASTOLIC BLOOD PRESSURE: 82 MMHG | BODY MASS INDEX: 45.96 KG/M2 | WEIGHT: 286 LBS | HEIGHT: 66 IN | HEART RATE: 90 BPM | TEMPERATURE: 99 F | SYSTOLIC BLOOD PRESSURE: 132 MMHG | OXYGEN SATURATION: 99 %

## 2023-01-06 DIAGNOSIS — M54.41 CHRONIC RIGHT-SIDED LOW BACK PAIN WITH RIGHT-SIDED SCIATICA: Primary | ICD-10-CM

## 2023-01-06 DIAGNOSIS — L30.9 ACUTE DERMATITIS: ICD-10-CM

## 2023-01-06 DIAGNOSIS — B36.9 FUNGAL DERMATITIS: ICD-10-CM

## 2023-01-06 DIAGNOSIS — Z79.4 TYPE 2 DIABETES MELLITUS WITHOUT COMPLICATION, WITH LONG-TERM CURRENT USE OF INSULIN (HCC): ICD-10-CM

## 2023-01-06 DIAGNOSIS — E11.9 TYPE 2 DIABETES MELLITUS WITHOUT COMPLICATION, WITH LONG-TERM CURRENT USE OF INSULIN (HCC): ICD-10-CM

## 2023-01-06 DIAGNOSIS — M51.37 DEGENERATIVE DISC DISEASE AT L5-S1 LEVEL: ICD-10-CM

## 2023-01-06 DIAGNOSIS — M25.551 RIGHT HIP PAIN: ICD-10-CM

## 2023-01-06 DIAGNOSIS — G47.00 INSOMNIA, UNSPECIFIED TYPE: ICD-10-CM

## 2023-01-06 DIAGNOSIS — G89.29 CHRONIC RIGHT-SIDED LOW BACK PAIN WITH RIGHT-SIDED SCIATICA: Primary | ICD-10-CM

## 2023-01-06 LAB
CREATININE URINE: 109.4 MG/DL (ref 28–217)
HBA1C MFR BLD: 10.1 %
MICROALBUMIN/CREAT 24H UR: 13 MG/L
MICROALBUMIN/CREAT UR-RTO: 12 MCG/MG CREAT

## 2023-01-06 PROCEDURE — G8417 CALC BMI ABV UP PARAM F/U: HCPCS | Performed by: NURSE PRACTITIONER

## 2023-01-06 PROCEDURE — 99214 OFFICE O/P EST MOD 30 MIN: CPT | Performed by: NURSE PRACTITIONER

## 2023-01-06 PROCEDURE — 1036F TOBACCO NON-USER: CPT | Performed by: NURSE PRACTITIONER

## 2023-01-06 PROCEDURE — 83036 HEMOGLOBIN GLYCOSYLATED A1C: CPT | Performed by: NURSE PRACTITIONER

## 2023-01-06 PROCEDURE — 3046F HEMOGLOBIN A1C LEVEL >9.0%: CPT | Performed by: NURSE PRACTITIONER

## 2023-01-06 PROCEDURE — G8484 FLU IMMUNIZE NO ADMIN: HCPCS | Performed by: NURSE PRACTITIONER

## 2023-01-06 PROCEDURE — G8427 DOCREV CUR MEDS BY ELIG CLIN: HCPCS | Performed by: NURSE PRACTITIONER

## 2023-01-06 PROCEDURE — 2022F DILAT RTA XM EVC RTNOPTHY: CPT | Performed by: NURSE PRACTITIONER

## 2023-01-06 RX ORDER — DULAGLUTIDE 1.5 MG/.5ML
1.5 INJECTION, SOLUTION SUBCUTANEOUS WEEKLY
Qty: 2 ML | Refills: 3 | Status: SHIPPED | OUTPATIENT
Start: 2023-01-06 | End: 2024-01-06

## 2023-01-06 RX ORDER — OXYCODONE HYDROCHLORIDE AND ACETAMINOPHEN 5; 325 MG/1; MG/1
1 TABLET ORAL 2 TIMES DAILY PRN
Qty: 60 TABLET | Refills: 0 | Status: SHIPPED | OUTPATIENT
Start: 2023-01-06 | End: 2023-01-20 | Stop reason: SDUPTHER

## 2023-01-06 RX ORDER — QUETIAPINE FUMARATE 25 MG/1
25 TABLET, FILM COATED ORAL NIGHTLY
Qty: 30 TABLET | Refills: 1 | Status: SHIPPED | OUTPATIENT
Start: 2023-01-06 | End: 2024-01-06

## 2023-01-06 RX ORDER — NYSTATIN 100000 [USP'U]/G
POWDER TOPICAL
Qty: 60 G | Refills: 3 | Status: SHIPPED | OUTPATIENT
Start: 2023-01-06

## 2023-01-06 RX ORDER — FLUCONAZOLE 150 MG/1
150 TABLET ORAL
Qty: 7 TABLET | Refills: 0 | Status: SHIPPED | OUTPATIENT
Start: 2023-01-06 | End: 2023-01-25

## 2023-01-06 RX ORDER — AMOXICILLIN AND CLAVULANATE POTASSIUM 600; 42.9 MG/5ML; MG/5ML
600 POWDER, FOR SUSPENSION ORAL 2 TIMES DAILY
Qty: 100 ML | Refills: 0 | Status: SHIPPED | OUTPATIENT
Start: 2023-01-06 | End: 2023-01-16

## 2023-01-06 ASSESSMENT — ANXIETY QUESTIONNAIRES
IF YOU CHECKED OFF ANY PROBLEMS ON THIS QUESTIONNAIRE, HOW DIFFICULT HAVE THESE PROBLEMS MADE IT FOR YOU TO DO YOUR WORK, TAKE CARE OF THINGS AT HOME, OR GET ALONG WITH OTHER PEOPLE: SOMEWHAT DIFFICULT
7. FEELING AFRAID AS IF SOMETHING AWFUL MIGHT HAPPEN: 0
6. BECOMING EASILY ANNOYED OR IRRITABLE: 3
4. TROUBLE RELAXING: 1
3. WORRYING TOO MUCH ABOUT DIFFERENT THINGS: 0
GAD7 TOTAL SCORE: 5
2. NOT BEING ABLE TO STOP OR CONTROL WORRYING: 0
5. BEING SO RESTLESS THAT IT IS HARD TO SIT STILL: 0
1. FEELING NERVOUS, ANXIOUS, OR ON EDGE: 1

## 2023-01-06 ASSESSMENT — PATIENT HEALTH QUESTIONNAIRE - PHQ9
1. LITTLE INTEREST OR PLEASURE IN DOING THINGS: 0
9. THOUGHTS THAT YOU WOULD BE BETTER OFF DEAD, OR OF HURTING YOURSELF: 0
5. POOR APPETITE OR OVEREATING: 0
2. FEELING DOWN, DEPRESSED OR HOPELESS: 1
SUM OF ALL RESPONSES TO PHQ QUESTIONS 1-9: 3
10. IF YOU CHECKED OFF ANY PROBLEMS, HOW DIFFICULT HAVE THESE PROBLEMS MADE IT FOR YOU TO DO YOUR WORK, TAKE CARE OF THINGS AT HOME, OR GET ALONG WITH OTHER PEOPLE: 0
SUM OF ALL RESPONSES TO PHQ QUESTIONS 1-9: 3
7. TROUBLE CONCENTRATING ON THINGS, SUCH AS READING THE NEWSPAPER OR WATCHING TELEVISION: 0
SUM OF ALL RESPONSES TO PHQ QUESTIONS 1-9: 3
SUM OF ALL RESPONSES TO PHQ QUESTIONS 1-9: 3
3. TROUBLE FALLING OR STAYING ASLEEP: 1
SUM OF ALL RESPONSES TO PHQ9 QUESTIONS 1 & 2: 1
8. MOVING OR SPEAKING SO SLOWLY THAT OTHER PEOPLE COULD HAVE NOTICED. OR THE OPPOSITE, BEING SO FIGETY OR RESTLESS THAT YOU HAVE BEEN MOVING AROUND A LOT MORE THAN USUAL: 0
4. FEELING TIRED OR HAVING LITTLE ENERGY: 1
6. FEELING BAD ABOUT YOURSELF - OR THAT YOU ARE A FAILURE OR HAVE LET YOURSELF OR YOUR FAMILY DOWN: 0

## 2023-01-06 NOTE — PROGRESS NOTES
Marycruz Bustamante (:  1980) is a 38 y.o. female,Established patient, here for evaluation of the following chief complaint(s):  Back Pain (Lower back pain into abdomin)         ASSESSMENT/PLAN:  1. Chronic right-sided low back pain with right-sided sciatica  2. Degenerative disc disease at L5-S1 level  -     Ezella Castleman, MD, Orthopedic Surgery, [place]  3. Right hip pain  -     Amira - Seda Connors DO, Orthopedic Surgery, [place]  4. Type 2 diabetes mellitus without complication, with long-term current use of insulin (HCC)  -     POCT glycosylated hemoglobin (Hb A1C)  -     dulaglutide (TRULICITY) 1.5 NS/3.3NR SC injection; Inject 0.5 mLs into the skin once a week, Disp-2 mL, R-3Normal  5. Fungal dermatitis  -     fluconazole (DIFLUCAN) 150 MG tablet; Take 1 tablet by mouth every 72 hours for 7 doses, Disp-7 tablet, R-0Normal  -     nystatin (MYCOSTATIN) 837665 UNIT/GM powder; Apply 3 times daily. , Disp-60 g, R-3, Normal  6. Acute dermatitis  -     Microalbumin, Ur; Future  -     amoxicillin-clavulanate (AUGMENTIN-ES) 600-42.9 MG/5ML suspension; Take 5 mLs by mouth 2 times daily for 10 days, Disp-100 mL, R-0Normal  7. Insomnia, unspecified type  -     QUEtiapine (SEROQUEL) 25 MG tablet; Take 1 tablet by mouth at bedtime, Disp-30 tablet, R-1Normal    Return in about 4 weeks (around 2/3/2023), or if symptoms worsen or fail to improve. Subjective   SUBJECTIVE/OBJECTIVE:  Presents office today for routine follow-up and med check for chronic pain. Reports she has been doing okay. Mother is present for visit today. POCT a1c in office today is 10. 1. will increase trulicity dose as well as novolin N 20u in morning, 30u at night. She continues to report severe pain in her left hip and lower back. Will refer to orthopedics. Also discussed changed in pain medication today. Will change to Percocet and see if that works better.   Rash continues in skin folds however does appear to be slightly improved. She still has some areas suspicious for staph infections. We will treat for staph cellulitis as well as fungal dermatitis. Follow-up in office as scheduled in 1 month. Review of Systems   Constitutional:  Negative for activity change, appetite change, fatigue and fever. HENT:  Negative for congestion, rhinorrhea and sore throat. Eyes:  Negative for visual disturbance. Wears glasses   Respiratory:  Negative for cough and shortness of breath. Cardiovascular:  Negative for chest pain and palpitations. Gastrointestinal:  Negative for abdominal distention, constipation, diarrhea, nausea and vomiting. Endocrine: Negative for polydipsia, polyphagia and polyuria. DM   Genitourinary:  Positive for vaginal discharge. Negative for difficulty urinating, dysuria, frequency and urgency. Musculoskeletal:  Positive for arthralgias, back pain and gait problem. Cane for ambulation   Skin:  Positive for rash (under breasts, groin skin folds). Allergic/Immunologic: Negative for immunocompromised state. Several medication allergies; see list   Neurological:  Negative for syncope, light-headedness and headaches. MRDD   Psychiatric/Behavioral:  Negative for decreased concentration, dysphoric mood, sleep disturbance and suicidal ideas. The patient is not nervous/anxious. Objective   Physical Exam  Vitals and nursing note reviewed. Constitutional:       General: She is not in acute distress. Appearance: She is not ill-appearing. HENT:      Head: Normocephalic. Nose: Nose normal.      Mouth/Throat:      Lips: Pink. Cardiovascular:      Rate and Rhythm: Normal rate. Heart sounds: No murmur heard. Pulmonary:      Effort: Pulmonary effort is normal. No respiratory distress. Breath sounds: No decreased breath sounds, wheezing or rhonchi. Skin:     Findings: Rash (diffuse in skin folds) present.    Neurological:      Mental Status: She is alert and oriented to person, place, and time. Psychiatric:         Attention and Perception: Attention normal.         Speech: Speech normal.         Behavior: Behavior is cooperative. An electronic signature was used to authenticate this note.     --VIVIENNE Alvarez - NP

## 2023-01-10 DIAGNOSIS — R11.0 NAUSEA: ICD-10-CM

## 2023-01-10 DIAGNOSIS — G25.81 RLS (RESTLESS LEGS SYNDROME): ICD-10-CM

## 2023-01-10 NOTE — TELEPHONE ENCOUNTER
Last visit: 1/6/2023    Last Med refill: 9/29/2022  Does patient have enough medication for 72 hours: Yes    Next Visit Date:  Future Appointments   Date Time Provider Veena Stanley   2/17/2023  3:15 PM VIVIENNE Alejo NP [address] Maintenance   Topic Date Due    Diabetic foot exam  Never done    Diabetic Alb to Cr ratio (uACR) test  Never done    Diabetic retinal exam  Never done    Flu vaccine (1) 09/29/2023 (Originally 8/1/2022)    Hepatitis B vaccine (1 of 3 - Risk 3-dose series) 11/03/2023 (Originally 6/15/1997)    Pneumococcal 0-64 years Vaccine (1 - PCV) 11/03/2023 (Originally 6/15/1984)    COVID-19 Vaccine (2 - Pfizer series) 01/06/2024 (Originally 5/20/2021)    HIV screen  01/06/2024 (Originally 6/15/1993)    A1C test (Diabetic or Prediabetic)  04/06/2023    Lipids  09/29/2023    GFR test (Diabetes, CKD 3-4, OR last GFR 15-59)  09/29/2023    Depression Monitoring  01/06/2024    DTaP/Tdap/Td vaccine (4 - Td or Tdap) 04/08/2028    Hepatitis A vaccine  Aged Out    Hib vaccine  Aged Out    Meningococcal (ACWY) vaccine  Aged Out    Hepatitis C screen  Discontinued       Hemoglobin A1C (%)   Date Value   01/06/2023 10.1   09/29/2022 13.1 (H)             ( goal A1C is < 7)   Microalb/Crt. Ratio (mcg/mg creat)   Date Value   01/06/2023 12     LDL Cholesterol (mg/dL)   Date Value   09/29/2022 134 (H)       (goal LDL is <100)   AST (U/L)   Date Value   09/29/2022 28     ALT (U/L)   Date Value   09/29/2022 20     BUN (mg/dL)   Date Value   09/29/2022 11     BP Readings from Last 3 Encounters:   01/06/23 132/82   11/03/22 124/80   09/29/22 128/82          (goal 120/80)    All Future Testing planned in [facility]              There is no problem list on file for this patient.

## 2023-01-10 NOTE — TELEPHONE ENCOUNTER
Last visit: 1/6/2023    Last Med refill: 12/20/2022  Does patient have enough medication for 72 hours: Yes    Next Visit Date:  Future Appointments   Date Time Provider Veena Stanley   2/17/2023  3:15 PM VIVIENNE Ambrose NP [facility] [address] Maintenance   Topic Date Due    Diabetic foot exam  Never done    Diabetic Alb to Cr ratio (uACR) test  Never done    Diabetic retinal exam  Never done    Flu vaccine (1) 09/29/2023 (Originally 8/1/2022)    Hepatitis B vaccine (1 of 3 - Risk 3-dose series) 11/03/2023 (Originally 6/15/1997)    Pneumococcal 0-64 years Vaccine (1 - PCV) 11/03/2023 (Originally 6/15/1984)    COVID-19 Vaccine (2 - Pfizer series) 01/06/2024 (Originally 5/20/2021)    HIV screen  01/06/2024 (Originally 6/15/1993)    A1C test (Diabetic or Prediabetic)  04/06/2023    Lipids  09/29/2023    GFR test (Diabetes, CKD 3-4, OR last GFR 15-59)  09/29/2023    Depression Monitoring  01/06/2024    DTaP/Tdap/Td vaccine (4 - Td or Tdap) 04/08/2028    Hepatitis A vaccine  Aged Out    Hib vaccine  Aged Out    Meningococcal (ACWY) vaccine  Aged Out    Hepatitis C screen  Discontinued       Hemoglobin A1C (%)   Date Value   01/06/2023 10.1   09/29/2022 13.1 (H)             ( goal A1C is < 7)   Microalb/Crt. Ratio (mcg/mg creat)   Date Value   01/06/2023 12     LDL Cholesterol (mg/dL)   Date Value   09/29/2022 134 (H)       (goal LDL is <100)   AST (U/L)   Date Value   09/29/2022 28     ALT (U/L)   Date Value   09/29/2022 20     BUN (mg/dL)   Date Value   09/29/2022 11     BP Readings from Last 3 Encounters:   01/06/23 132/82   11/03/22 124/80   09/29/22 128/82          (goal 120/80)    All Future Testing planned in [facility]              There is no problem list on file for this patient.

## 2023-01-11 RX ORDER — ROPINIROLE 2 MG/1
2 TABLET, FILM COATED ORAL DAILY
Qty: 30 TABLET | Refills: 3 | Status: SHIPPED | OUTPATIENT
Start: 2023-01-11 | End: 2024-01-11

## 2023-01-11 RX ORDER — ONDANSETRON 4 MG/1
4 TABLET, ORALLY DISINTEGRATING ORAL 3 TIMES DAILY PRN
Qty: 21 TABLET | Refills: 0 | OUTPATIENT
Start: 2023-01-11

## 2023-01-11 RX ORDER — ONDANSETRON 4 MG/1
4 TABLET, ORALLY DISINTEGRATING ORAL 3 TIMES DAILY PRN
Qty: 21 TABLET | Refills: 0 | Status: SHIPPED | OUTPATIENT
Start: 2023-01-11

## 2023-01-12 ENCOUNTER — PATIENT MESSAGE (OUTPATIENT)
Dept: FAMILY MEDICINE CLINIC | Age: 43
End: 2023-01-12

## 2023-01-12 DIAGNOSIS — U07.1 COVID-19: Primary | ICD-10-CM

## 2023-01-12 NOTE — TELEPHONE ENCOUNTER
Pt symptoms are lost of taste and smell, a cough, and a really bad sore throat with headaches and body aches and fever.

## 2023-01-12 NOTE — TELEPHONE ENCOUNTER
From: Jane Jeff  To:  Justino Ferguson  Sent: 1/12/2023 12:20 AM EST  Subject: I have covid 19     I have covid 19 did a home test it came back postvie so I was wondering if you could send me in some pailx of covid 19 I have to move soon

## 2023-01-19 DIAGNOSIS — M51.37 DEGENERATIVE DISC DISEASE AT L5-S1 LEVEL: ICD-10-CM

## 2023-01-19 DIAGNOSIS — M25.551 RIGHT HIP PAIN: ICD-10-CM

## 2023-01-19 NOTE — TELEPHONE ENCOUNTER
LOV 1/6/23  LRF 1/6/23    Next appt 2/17/23      Health Maintenance   Topic Date Due    Diabetic foot exam  Never done    Diabetic retinal exam  Never done    Flu vaccine (1) 09/29/2023 (Originally 8/1/2022)    Hepatitis B vaccine (1 of 3 - Risk 3-dose series) 11/03/2023 (Originally 6/15/1997)    Pneumococcal 0-64 years Vaccine (1 - PCV) 11/03/2023 (Originally 6/15/1984)    COVID-19 Vaccine (2 - Pfizer series) 01/06/2024 (Originally 5/20/2021)    HIV screen  01/06/2024 (Originally 6/15/1993)    A1C test (Diabetic or Prediabetic)  04/06/2023    Lipids  09/29/2023    GFR test (Diabetes, CKD 3-4, OR last GFR 15-59)  09/29/2023    Diabetic Alb to Cr ratio (uACR) test  01/06/2024    Depression Monitoring  01/06/2024    DTaP/Tdap/Td vaccine (4 - Td or Tdap) 04/08/2028    Hepatitis A vaccine  Aged Out    Hib vaccine  Aged Out    Meningococcal (ACWY) vaccine  Aged Out    Hepatitis C screen  Discontinued             (applicable per patient's age: Cancer Screenings, Depression Screening, Fall Risk Screening, Immunizations)    Hemoglobin A1C (%)   Date Value   01/06/2023 10.1   09/29/2022 13.1 (H)     Microalb/Crt. Ratio (mcg/mg creat)   Date Value   01/06/2023 12     LDL Cholesterol (mg/dL)   Date Value   09/29/2022 134 (H)     AST (U/L)   Date Value   09/29/2022 28     ALT (U/L)   Date Value   09/29/2022 20     BUN (mg/dL)   Date Value   09/29/2022 11      (goal A1C is < 7)   (goal LDL is <100) need 30-50% reduction from baseline     BP Readings from Last 3 Encounters:   01/06/23 132/82   11/03/22 124/80   09/29/22 128/82    (goal /80)      All Future Testing planned in [facility]:      Next Visit Date:  Future Appointments   Date Time Provider Veena Stanley   2/17/2023  3:15 PM VIVIENNE Godinez NP [facility]            There is no problem list on file for this patient.

## 2023-01-20 RX ORDER — OXYCODONE HYDROCHLORIDE AND ACETAMINOPHEN 5; 325 MG/1; MG/1
1 TABLET ORAL 2 TIMES DAILY PRN
Qty: 60 TABLET | Refills: 0 | OUTPATIENT
Start: 2023-01-20 | End: 2023-02-19

## 2023-01-20 RX ORDER — OXYCODONE HYDROCHLORIDE AND ACETAMINOPHEN 5; 325 MG/1; MG/1
1 TABLET ORAL EVERY 8 HOURS PRN
Qty: 90 TABLET | Refills: 0 | Status: SHIPPED | OUTPATIENT
Start: 2023-01-20 | End: 2023-02-19

## 2023-01-23 ASSESSMENT — ENCOUNTER SYMPTOMS
NAUSEA: 0
SHORTNESS OF BREATH: 0
CONSTIPATION: 0
ABDOMINAL DISTENTION: 0
DIARRHEA: 0
COUGH: 0
SORE THROAT: 0
BACK PAIN: 1
VOMITING: 0
RHINORRHEA: 0

## 2023-02-15 DIAGNOSIS — R11.0 NAUSEA: ICD-10-CM

## 2023-02-15 DIAGNOSIS — M25.551 RIGHT HIP PAIN: ICD-10-CM

## 2023-02-15 DIAGNOSIS — M51.37 DEGENERATIVE DISC DISEASE AT L5-S1 LEVEL: ICD-10-CM

## 2023-02-15 RX ORDER — OXYCODONE HYDROCHLORIDE AND ACETAMINOPHEN 5; 325 MG/1; MG/1
1 TABLET ORAL EVERY 8 HOURS PRN
Qty: 90 TABLET | Refills: 0 | Status: SHIPPED | OUTPATIENT
Start: 2023-02-15 | End: 2023-03-17

## 2023-02-15 RX ORDER — ONDANSETRON 4 MG/1
4 TABLET, ORALLY DISINTEGRATING ORAL 3 TIMES DAILY PRN
Qty: 21 TABLET | Refills: 0 | Status: SHIPPED | OUTPATIENT
Start: 2023-02-15

## 2023-02-15 NOTE — TELEPHONE ENCOUNTER
LOV 1/6/23  LRF 1/20/23    Next appt 3/22/23      Health Maintenance   Topic Date Due    Diabetic foot exam  Never done    Diabetic retinal exam  Never done    Flu vaccine (1) 09/29/2023 (Originally 8/1/2022)    Hepatitis B vaccine (1 of 3 - Risk 3-dose series) 11/03/2023 (Originally 6/15/1997)    Pneumococcal 0-64 years Vaccine (1 - PCV) 11/03/2023 (Originally 6/15/1984)    COVID-19 Vaccine (2 - Pfizer series) 01/06/2024 (Originally 5/20/2021)    HIV screen  01/06/2024 (Originally 6/15/1993)    A1C test (Diabetic or Prediabetic)  04/06/2023    Lipids  09/29/2023    GFR test (Diabetes, CKD 3-4, OR last GFR 15-59)  09/29/2023    Diabetic Alb to Cr ratio (uACR) test  01/06/2024    Depression Monitoring  01/06/2024    DTaP/Tdap/Td vaccine (4 - Td or Tdap) 04/08/2028    Hepatitis A vaccine  Aged Out    Hib vaccine  Aged Out    Meningococcal (ACWY) vaccine  Aged Out    Hepatitis C screen  Discontinued             (applicable per patient's age: Cancer Screenings, Depression Screening, Fall Risk Screening, Immunizations)    Hemoglobin A1C (%)   Date Value   01/06/2023 10.1   09/29/2022 13.1 (H)     Microalb/Crt. Ratio (mcg/mg creat)   Date Value   01/06/2023 12     LDL Cholesterol (mg/dL)   Date Value   09/29/2022 134 (H)     AST (U/L)   Date Value   09/29/2022 28     ALT (U/L)   Date Value   09/29/2022 20     BUN (mg/dL)   Date Value   09/29/2022 11      (goal A1C is < 7)   (goal LDL is <100) need 30-50% reduction from baseline     BP Readings from Last 3 Encounters:   01/06/23 132/82   11/03/22 124/80   09/29/22 128/82    (goal /80)      All Future Testing planned in [facility]:      Next Visit Date:  Future Appointments   Date Time Provider Veena Stanley   3/22/2023  1:45 PM VIVIENNE Jimenez NP [facility]            There is no problem list on file for this patient.

## 2023-03-01 DIAGNOSIS — M51.37 DEGENERATIVE DISC DISEASE AT L5-S1 LEVEL: ICD-10-CM

## 2023-03-01 DIAGNOSIS — M25.551 RIGHT HIP PAIN: ICD-10-CM

## 2023-03-02 RX ORDER — OXYCODONE HYDROCHLORIDE AND ACETAMINOPHEN 5; 325 MG/1; MG/1
1 TABLET ORAL EVERY 8 HOURS PRN
Qty: 90 TABLET | Refills: 0 | OUTPATIENT
Start: 2023-03-02 | End: 2023-04-01

## 2023-03-13 DIAGNOSIS — M25.551 RIGHT HIP PAIN: ICD-10-CM

## 2023-03-13 DIAGNOSIS — M51.37 DEGENERATIVE DISC DISEASE AT L5-S1 LEVEL: ICD-10-CM

## 2023-03-14 DIAGNOSIS — M25.551 RIGHT HIP PAIN: ICD-10-CM

## 2023-03-14 DIAGNOSIS — M51.37 DEGENERATIVE DISC DISEASE AT L5-S1 LEVEL: ICD-10-CM

## 2023-03-14 RX ORDER — OXYCODONE HYDROCHLORIDE AND ACETAMINOPHEN 5; 325 MG/1; MG/1
1 TABLET ORAL EVERY 8 HOURS PRN
Qty: 90 TABLET | Refills: 0 | OUTPATIENT
Start: 2023-03-14 | End: 2023-04-13

## 2023-03-14 NOTE — TELEPHONE ENCOUNTER
LOV 1/6/23  LRF 2/15/23    Next appt 3/22/23      Health Maintenance   Topic Date Due    Diabetic foot exam  Never done    Diabetic retinal exam  Never done    A1C test (Diabetic or Prediabetic)  04/06/2023    Flu vaccine (1) 09/29/2023 (Originally 8/1/2022)    Hepatitis B vaccine (1 of 3 - Risk 3-dose series) 11/03/2023 (Originally 6/15/1997)    Pneumococcal 0-64 years Vaccine (1 - PCV) 11/03/2023 (Originally 6/15/1984)    COVID-19 Vaccine (2 - Pfizer series) 01/06/2024 (Originally 5/20/2021)    HIV screen  01/06/2024 (Originally 6/15/1993)    Lipids  09/29/2023    GFR test (Diabetes, CKD 3-4, OR last GFR 15-59)  09/29/2023    Diabetic Alb to Cr ratio (uACR) test  01/06/2024    Depression Monitoring  01/06/2024    DTaP/Tdap/Td vaccine (4 - Td or Tdap) 04/08/2028    Hepatitis A vaccine  Aged Out    Hib vaccine  Aged Out    Meningococcal (ACWY) vaccine  Aged Out    Hepatitis C screen  Discontinued             (applicable per patient's age: Cancer Screenings, Depression Screening, Fall Risk Screening, Immunizations)    Hemoglobin A1C (%)   Date Value   01/06/2023 10.1   09/29/2022 13.1 (H)     Microalb/Crt. Ratio (mcg/mg creat)   Date Value   01/06/2023 12     LDL Cholesterol (mg/dL)   Date Value   09/29/2022 134 (H)     AST (U/L)   Date Value   09/29/2022 28     ALT (U/L)   Date Value   09/29/2022 20     BUN (mg/dL)   Date Value   09/29/2022 11      (goal A1C is < 7)   (goal LDL is <100) need 30-50% reduction from baseline     BP Readings from Last 3 Encounters:   01/06/23 132/82   11/03/22 124/80   09/29/22 128/82    (goal /80)      All Future Testing planned in [facility]:      Next Visit Date:  Future Appointments   Date Time Provider Veena Stanley   3/22/2023  1:45 PM VIVIENNE Fishman NP [facility]            There is no problem list on file for this patient.

## 2023-03-15 RX ORDER — OXYCODONE HYDROCHLORIDE AND ACETAMINOPHEN 5; 325 MG/1; MG/1
1 TABLET ORAL EVERY 8 HOURS PRN
Qty: 90 TABLET | Refills: 0 | Status: SHIPPED | OUTPATIENT
Start: 2023-03-15 | End: 2023-04-14

## 2023-03-16 DIAGNOSIS — R11.0 NAUSEA: ICD-10-CM

## 2023-03-16 NOTE — TELEPHONE ENCOUNTER
LOV 1/6/23  LRF 2/15/23    Next appt 3/22/23      Health Maintenance   Topic Date Due    Diabetic foot exam  Never done    Diabetic retinal exam  Never done    A1C test (Diabetic or Prediabetic)  04/06/2023    Flu vaccine (1) 09/29/2023 (Originally 8/1/2022)    Hepatitis B vaccine (1 of 3 - Risk 3-dose series) 11/03/2023 (Originally 6/15/1997)    Pneumococcal 0-64 years Vaccine (1 - PCV) 11/03/2023 (Originally 6/15/1984)    COVID-19 Vaccine (2 - Pfizer series) 01/06/2024 (Originally 5/20/2021)    HIV screen  01/06/2024 (Originally 6/15/1993)    Lipids  09/29/2023    GFR test (Diabetes, CKD 3-4, OR last GFR 15-59)  09/29/2023    Diabetic Alb to Cr ratio (uACR) test  01/06/2024    Depression Monitoring  01/06/2024    DTaP/Tdap/Td vaccine (4 - Td or Tdap) 04/08/2028    Hepatitis A vaccine  Aged Out    Hib vaccine  Aged Out    Meningococcal (ACWY) vaccine  Aged Out    Hepatitis C screen  Discontinued             (applicable per patient's age: Cancer Screenings, Depression Screening, Fall Risk Screening, Immunizations)    Hemoglobin A1C (%)   Date Value   01/06/2023 10.1   09/29/2022 13.1 (H)     Microalb/Crt. Ratio (mcg/mg creat)   Date Value   01/06/2023 12     LDL Cholesterol (mg/dL)   Date Value   09/29/2022 134 (H)     AST (U/L)   Date Value   09/29/2022 28     ALT (U/L)   Date Value   09/29/2022 20     BUN (mg/dL)   Date Value   09/29/2022 11      (goal A1C is < 7)   (goal LDL is <100) need 30-50% reduction from baseline     BP Readings from Last 3 Encounters:   01/06/23 132/82   11/03/22 124/80   09/29/22 128/82    (goal /80)      All Future Testing planned in [facility]:      Next Visit Date:  Future Appointments   Date Time Provider Veena Stanley   3/22/2023  1:45 PM VIVIENNE Urrutia NP [facility]            There is no problem list on file for this patient.

## 2023-03-17 RX ORDER — ONDANSETRON 4 MG/1
4 TABLET, ORALLY DISINTEGRATING ORAL 3 TIMES DAILY PRN
Qty: 21 TABLET | Refills: 0 | Status: SHIPPED | OUTPATIENT
Start: 2023-03-17

## 2023-03-23 DIAGNOSIS — G25.81 RLS (RESTLESS LEGS SYNDROME): ICD-10-CM

## 2023-03-23 RX ORDER — ROPINIROLE 2 MG/1
2 TABLET, FILM COATED ORAL DAILY
Qty: 30 TABLET | Refills: 3 | Status: SHIPPED | OUTPATIENT
Start: 2023-03-23 | End: 2024-03-22

## 2023-03-23 NOTE — TELEPHONE ENCOUNTER
Last visit: 1/6/2023    Last Med refill: 1/11/2023  Does patient have enough medication for 72 hours: Yes    Next Visit Date:  No future appointments. Health Maintenance   Topic Date Due    Diabetic foot exam  Never done    Diabetic retinal exam  Never done    A1C test (Diabetic or Prediabetic)  04/06/2023    Flu vaccine (1) 09/29/2023 (Originally 8/1/2022)    Hepatitis B vaccine (1 of 3 - Risk 3-dose series) 11/03/2023 (Originally 6/15/1997)    Pneumococcal 0-64 years Vaccine (1 - PCV) 11/03/2023 (Originally 6/15/1984)    COVID-19 Vaccine (2 - Pfizer series) 01/06/2024 (Originally 5/20/2021)    HIV screen  01/06/2024 (Originally 6/15/1993)    Lipids  09/29/2023    GFR test (Diabetes, CKD 3-4, OR last GFR 15-59)  09/29/2023    Diabetic Alb to Cr ratio (uACR) test  01/06/2024    Depression Monitoring  01/06/2024    DTaP/Tdap/Td vaccine (4 - Td or Tdap) 04/08/2028    Hepatitis A vaccine  Aged Out    Hib vaccine  Aged Out    Meningococcal (ACWY) vaccine  Aged Out    Hepatitis C screen  Discontinued       Hemoglobin A1C (%)   Date Value   01/06/2023 10.1   09/29/2022 13.1 (H)             ( goal A1C is < 7)   Microalb/Crt. Ratio (mcg/mg creat)   Date Value   01/06/2023 12     LDL Cholesterol (mg/dL)   Date Value   09/29/2022 134 (H)       (goal LDL is <100)   AST (U/L)   Date Value   09/29/2022 28     ALT (U/L)   Date Value   09/29/2022 20     BUN (mg/dL)   Date Value   09/29/2022 11     BP Readings from Last 3 Encounters:   01/06/23 132/82   11/03/22 124/80   09/29/22 128/82          (goal 120/80)    All Future Testing planned in [facility]              There is no problem list on file for this patient.

## 2023-04-05 DIAGNOSIS — M25.551 RIGHT HIP PAIN: ICD-10-CM

## 2023-04-05 DIAGNOSIS — R11.0 NAUSEA: ICD-10-CM

## 2023-04-05 DIAGNOSIS — M51.37 DEGENERATIVE DISC DISEASE AT L5-S1 LEVEL: ICD-10-CM

## 2023-04-05 RX ORDER — OXYCODONE HYDROCHLORIDE AND ACETAMINOPHEN 5; 325 MG/1; MG/1
1 TABLET ORAL EVERY 8 HOURS PRN
Qty: 90 TABLET | Refills: 0 | Status: SHIPPED | OUTPATIENT
Start: 2023-04-05 | End: 2023-05-05

## 2023-04-05 RX ORDER — ONDANSETRON 4 MG/1
4 TABLET, ORALLY DISINTEGRATING ORAL 3 TIMES DAILY PRN
Qty: 21 TABLET | Refills: 0 | Status: SHIPPED | OUTPATIENT
Start: 2023-04-05

## 2023-04-05 NOTE — TELEPHONE ENCOUNTER
LOV 1/6/23  LRF 3/17/23     No future appt schedule. Health Maintenance   Topic Date Due    Diabetic foot exam  Never done    Diabetic retinal exam  Never done    A1C test (Diabetic or Prediabetic)  04/06/2023    Flu vaccine (Season Ended) 09/29/2023 (Originally 8/1/2023)    Hepatitis B vaccine (1 of 3 - Risk 3-dose series) 11/03/2023 (Originally 6/15/1997)    Pneumococcal 0-64 years Vaccine (1 - PCV) 11/03/2023 (Originally 6/15/1984)    COVID-19 Vaccine (2 - Pfizer series) 01/06/2024 (Originally 5/20/2021)    HIV screen  01/06/2024 (Originally 6/15/1993)    Lipids  09/29/2023    GFR test (Diabetes, CKD 3-4, OR last GFR 15-59)  09/29/2023    Diabetic Alb to Cr ratio (uACR) test  01/06/2024    Depression Monitoring  01/06/2024    DTaP/Tdap/Td vaccine (4 - Td or Tdap) 04/08/2028    Hepatitis A vaccine  Aged Out    Hib vaccine  Aged Out    Meningococcal (ACWY) vaccine  Aged Out    Depression Screen  Discontinued    Diabetes screen  Discontinued    Hepatitis C screen  Discontinued             (applicable per patient's age: Cancer Screenings, Depression Screening, Fall Risk Screening, Immunizations)    Hemoglobin A1C (%)   Date Value   01/06/2023 10.1   09/29/2022 13.1 (H)     Microalb/Crt. Ratio (mcg/mg creat)   Date Value   01/06/2023 12     LDL Cholesterol (mg/dL)   Date Value   09/29/2022 134 (H)     AST (U/L)   Date Value   09/29/2022 28     ALT (U/L)   Date Value   09/29/2022 20     BUN (mg/dL)   Date Value   09/29/2022 11      (goal A1C is < 7)   (goal LDL is <100) need 30-50% reduction from baseline     BP Readings from Last 3 Encounters:   01/06/23 132/82   11/03/22 124/80   09/29/22 128/82    (goal /80)      All Future Testing planned in [facility]:      Next Visit Date:  No future appointments. There is no problem list on file for this patient.

## 2023-04-05 NOTE — TELEPHONE ENCOUNTER
LOV 1/6/23  LRF 3/15/23     No future appt schedule. Message sent to pt to schedule appt    Health Maintenance   Topic Date Due    Diabetic foot exam  Never done    Diabetic retinal exam  Never done    A1C test (Diabetic or Prediabetic)  04/06/2023    Flu vaccine (Season Ended) 09/29/2023 (Originally 8/1/2023)    Hepatitis B vaccine (1 of 3 - Risk 3-dose series) 11/03/2023 (Originally 6/15/1997)    Pneumococcal 0-64 years Vaccine (1 - PCV) 11/03/2023 (Originally 6/15/1984)    COVID-19 Vaccine (2 - Pfizer series) 01/06/2024 (Originally 5/20/2021)    HIV screen  01/06/2024 (Originally 6/15/1993)    Lipids  09/29/2023    GFR test (Diabetes, CKD 3-4, OR last GFR 15-59)  09/29/2023    Diabetic Alb to Cr ratio (uACR) test  01/06/2024    Depression Monitoring  01/06/2024    DTaP/Tdap/Td vaccine (4 - Td or Tdap) 04/08/2028    Hepatitis A vaccine  Aged Out    Hib vaccine  Aged Out    Meningococcal (ACWY) vaccine  Aged Out    Depression Screen  Discontinued    Diabetes screen  Discontinued    Hepatitis C screen  Discontinued             (applicable per patient's age: Cancer Screenings, Depression Screening, Fall Risk Screening, Immunizations)    Hemoglobin A1C (%)   Date Value   01/06/2023 10.1   09/29/2022 13.1 (H)     Microalb/Crt. Ratio (mcg/mg creat)   Date Value   01/06/2023 12     LDL Cholesterol (mg/dL)   Date Value   09/29/2022 134 (H)     AST (U/L)   Date Value   09/29/2022 28     ALT (U/L)   Date Value   09/29/2022 20     BUN (mg/dL)   Date Value   09/29/2022 11      (goal A1C is < 7)   (goal LDL is <100) need 30-50% reduction from baseline     BP Readings from Last 3 Encounters:   01/06/23 132/82   11/03/22 124/80   09/29/22 128/82    (goal /80)      All Future Testing planned in [facility]:      Next Visit Date:  No future appointments. There is no problem list on file for this patient.

## 2023-04-26 ENCOUNTER — NURSE TRIAGE (OUTPATIENT)
Dept: OTHER | Age: 43
End: 2023-04-26

## 2023-04-27 NOTE — TELEPHONE ENCOUNTER
Mom calls to state daughter is still in pain after taking her medications. Mom would like to discuss medications with Provider. Per Care Advice, and Provider Medication Policy, Mom instructed to contact office in am. Mom verbalized understanding, and is agreeable. No further questions or concerns.        Reason for Disposition   [1] Caller has NON-URGENT medicine question about med that PCP prescribed AND [2] triager unable to answer question   [place] wants to use a complementary or alternative medicine    Protocols used: Medication Question Call-ADULT-

## 2023-05-01 DIAGNOSIS — R11.0 NAUSEA: ICD-10-CM

## 2023-05-02 RX ORDER — ONDANSETRON 4 MG/1
4 TABLET, ORALLY DISINTEGRATING ORAL 3 TIMES DAILY PRN
Qty: 21 TABLET | Refills: 0 | Status: SHIPPED | OUTPATIENT
Start: 2023-05-02

## 2023-05-10 ENCOUNTER — HOSPITAL ENCOUNTER (OUTPATIENT)
Age: 43
Setting detail: SPECIMEN
Discharge: HOME | End: 2023-05-10

## 2023-05-10 ENCOUNTER — OFFICE VISIT (OUTPATIENT)
Dept: FAMILY MEDICINE CLINIC | Age: 43
End: 2023-05-10
Payer: COMMERCIAL

## 2023-05-10 VITALS
HEART RATE: 93 BPM | BODY MASS INDEX: 46.12 KG/M2 | OXYGEN SATURATION: 98 % | WEIGHT: 287 LBS | SYSTOLIC BLOOD PRESSURE: 138 MMHG | HEIGHT: 66 IN | DIASTOLIC BLOOD PRESSURE: 82 MMHG | TEMPERATURE: 98.6 F

## 2023-05-10 DIAGNOSIS — H66.001 NON-RECURRENT ACUTE SUPPURATIVE OTITIS MEDIA OF RIGHT EAR WITHOUT SPONTANEOUS RUPTURE OF TYMPANIC MEMBRANE: ICD-10-CM

## 2023-05-10 DIAGNOSIS — M51.37 DEGENERATIVE DISC DISEASE AT L5-S1 LEVEL: ICD-10-CM

## 2023-05-10 DIAGNOSIS — J02.9 ACUTE VIRAL PHARYNGITIS: ICD-10-CM

## 2023-05-10 DIAGNOSIS — M25.552 BILATERAL HIP PAIN: ICD-10-CM

## 2023-05-10 DIAGNOSIS — J02.0 ACUTE STREPTOCOCCAL PHARYNGITIS: ICD-10-CM

## 2023-05-10 DIAGNOSIS — B36.9 FUNGAL DERMATITIS: ICD-10-CM

## 2023-05-10 DIAGNOSIS — E11.9 TYPE 2 DIABETES MELLITUS WITHOUT COMPLICATION, WITH LONG-TERM CURRENT USE OF INSULIN (HCC): ICD-10-CM

## 2023-05-10 DIAGNOSIS — R06.02 SOB (SHORTNESS OF BREATH): ICD-10-CM

## 2023-05-10 DIAGNOSIS — M25.551 BILATERAL HIP PAIN: ICD-10-CM

## 2023-05-10 DIAGNOSIS — R73.09 ABNORMAL GLUCOSE: ICD-10-CM

## 2023-05-10 DIAGNOSIS — M25.551 RIGHT HIP PAIN: Primary | ICD-10-CM

## 2023-05-10 DIAGNOSIS — Z79.4 TYPE 2 DIABETES MELLITUS WITHOUT COMPLICATION, WITH LONG-TERM CURRENT USE OF INSULIN (HCC): ICD-10-CM

## 2023-05-10 DIAGNOSIS — L30.9 ACUTE DERMATITIS: ICD-10-CM

## 2023-05-10 DIAGNOSIS — R11.0 NAUSEA: ICD-10-CM

## 2023-05-10 PROCEDURE — 99214 OFFICE O/P EST MOD 30 MIN: CPT | Performed by: NURSE PRACTITIONER

## 2023-05-10 PROCEDURE — 3052F HG A1C>EQUAL 8.0%<EQUAL 9.0%: CPT | Performed by: NURSE PRACTITIONER

## 2023-05-10 PROCEDURE — G8417 CALC BMI ABV UP PARAM F/U: HCPCS | Performed by: NURSE PRACTITIONER

## 2023-05-10 PROCEDURE — 2022F DILAT RTA XM EVC RTNOPTHY: CPT | Performed by: NURSE PRACTITIONER

## 2023-05-10 PROCEDURE — 1036F TOBACCO NON-USER: CPT | Performed by: NURSE PRACTITIONER

## 2023-05-10 PROCEDURE — G8427 DOCREV CUR MEDS BY ELIG CLIN: HCPCS | Performed by: NURSE PRACTITIONER

## 2023-05-10 RX ORDER — ALBUTEROL SULFATE 90 UG/1
AEROSOL, METERED RESPIRATORY (INHALATION)
COMMUNITY
Start: 2023-04-16 | End: 2023-05-10 | Stop reason: SDUPTHER

## 2023-05-10 RX ORDER — OXYCODONE HYDROCHLORIDE AND ACETAMINOPHEN 5; 325 MG/1; MG/1
1 TABLET ORAL EVERY 6 HOURS PRN
Qty: 120 TABLET | Refills: 0 | Status: SHIPPED | OUTPATIENT
Start: 2023-05-10 | End: 2023-06-09

## 2023-05-10 RX ORDER — NYSTATIN 100000 [USP'U]/G
POWDER TOPICAL
Qty: 60 G | Refills: 3 | Status: SHIPPED | OUTPATIENT
Start: 2023-05-10

## 2023-05-10 RX ORDER — AZITHROMYCIN 250 MG/1
250 TABLET, FILM COATED ORAL SEE ADMIN INSTRUCTIONS
Qty: 6 TABLET | Refills: 0 | Status: SHIPPED | OUTPATIENT
Start: 2023-05-10 | End: 2023-05-15

## 2023-05-10 RX ORDER — ALBUTEROL SULFATE 90 UG/1
AEROSOL, METERED RESPIRATORY (INHALATION)
Qty: 18 G | Refills: 3 | Status: SHIPPED | OUTPATIENT
Start: 2023-05-10

## 2023-05-10 RX ORDER — FLUCONAZOLE 150 MG/1
150 TABLET ORAL
Qty: 1 TABLET | Refills: 0 | Status: SHIPPED | OUTPATIENT
Start: 2023-05-10 | End: 2023-05-17

## 2023-05-10 RX ORDER — ONDANSETRON 4 MG/1
4 TABLET, ORALLY DISINTEGRATING ORAL 3 TIMES DAILY PRN
Qty: 21 TABLET | Refills: 0 | Status: SHIPPED | OUTPATIENT
Start: 2023-05-10 | End: 2023-05-22 | Stop reason: SDUPTHER

## 2023-05-10 SDOH — ECONOMIC STABILITY: INCOME INSECURITY: HOW HARD IS IT FOR YOU TO PAY FOR THE VERY BASICS LIKE FOOD, HOUSING, MEDICAL CARE, AND HEATING?: NOT HARD AT ALL

## 2023-05-10 SDOH — ECONOMIC STABILITY: HOUSING INSECURITY
IN THE LAST 12 MONTHS, WAS THERE A TIME WHEN YOU DID NOT HAVE A STEADY PLACE TO SLEEP OR SLEPT IN A SHELTER (INCLUDING NOW)?: NO

## 2023-05-10 SDOH — ECONOMIC STABILITY: FOOD INSECURITY: WITHIN THE PAST 12 MONTHS, YOU WORRIED THAT YOUR FOOD WOULD RUN OUT BEFORE YOU GOT MONEY TO BUY MORE.: NEVER TRUE

## 2023-05-10 SDOH — ECONOMIC STABILITY: FOOD INSECURITY: WITHIN THE PAST 12 MONTHS, THE FOOD YOU BOUGHT JUST DIDN'T LAST AND YOU DIDN'T HAVE MONEY TO GET MORE.: NEVER TRUE

## 2023-05-10 ASSESSMENT — PATIENT HEALTH QUESTIONNAIRE - PHQ9
SUM OF ALL RESPONSES TO PHQ QUESTIONS 1-9: 8
3. TROUBLE FALLING OR STAYING ASLEEP: 3
7. TROUBLE CONCENTRATING ON THINGS, SUCH AS READING THE NEWSPAPER OR WATCHING TELEVISION: 1
SUM OF ALL RESPONSES TO PHQ9 QUESTIONS 1 & 2: 0
5. POOR APPETITE OR OVEREATING: 1
6. FEELING BAD ABOUT YOURSELF - OR THAT YOU ARE A FAILURE OR HAVE LET YOURSELF OR YOUR FAMILY DOWN: 0
SUM OF ALL RESPONSES TO PHQ QUESTIONS 1-9: 8
2. FEELING DOWN, DEPRESSED OR HOPELESS: 0
SUM OF ALL RESPONSES TO PHQ QUESTIONS 1-9: 8
SUM OF ALL RESPONSES TO PHQ QUESTIONS 1-9: 8
8. MOVING OR SPEAKING SO SLOWLY THAT OTHER PEOPLE COULD HAVE NOTICED. OR THE OPPOSITE, BEING SO FIGETY OR RESTLESS THAT YOU HAVE BEEN MOVING AROUND A LOT MORE THAN USUAL: 0
1. LITTLE INTEREST OR PLEASURE IN DOING THINGS: 0
10. IF YOU CHECKED OFF ANY PROBLEMS, HOW DIFFICULT HAVE THESE PROBLEMS MADE IT FOR YOU TO DO YOUR WORK, TAKE CARE OF THINGS AT HOME, OR GET ALONG WITH OTHER PEOPLE: 0
4. FEELING TIRED OR HAVING LITTLE ENERGY: 3
9. THOUGHTS THAT YOU WOULD BE BETTER OFF DEAD, OR OF HURTING YOURSELF: 0

## 2023-05-10 ASSESSMENT — ENCOUNTER SYMPTOMS
COUGH: 0
CONSTIPATION: 1
ABDOMINAL PAIN: 0
SHORTNESS OF BREATH: 0
SORE THROAT: 1
NAUSEA: 1
BACK PAIN: 1
SINUS PRESSURE: 0
DIARRHEA: 0
TROUBLE SWALLOWING: 1
SINUS PAIN: 0
RHINORRHEA: 0
VOMITING: 0

## 2023-05-11 LAB
EST. AVERAGE GLUCOSE BLD GHB EST-MCNC: 200 MG/DL
HBA1C MFR BLD: 8.6 % (ref 4–6)

## 2023-05-12 DIAGNOSIS — Z79.4 TYPE 2 DIABETES MELLITUS WITHOUT COMPLICATION, WITH LONG-TERM CURRENT USE OF INSULIN (HCC): Primary | ICD-10-CM

## 2023-05-12 DIAGNOSIS — E11.9 TYPE 2 DIABETES MELLITUS WITHOUT COMPLICATION, WITH LONG-TERM CURRENT USE OF INSULIN (HCC): Primary | ICD-10-CM

## 2023-05-12 RX ORDER — DULAGLUTIDE 3 MG/.5ML
3 INJECTION, SOLUTION SUBCUTANEOUS WEEKLY
Qty: 3 ADJUSTABLE DOSE PRE-FILLED PEN SYRINGE | Refills: 3 | Status: SHIPPED | OUTPATIENT
Start: 2023-05-12

## 2023-05-15 DIAGNOSIS — J02.0 ACUTE STREPTOCOCCAL PHARYNGITIS: ICD-10-CM

## 2023-05-15 DIAGNOSIS — H66.001 NON-RECURRENT ACUTE SUPPURATIVE OTITIS MEDIA OF RIGHT EAR WITHOUT SPONTANEOUS RUPTURE OF TYMPANIC MEMBRANE: ICD-10-CM

## 2023-05-15 RX ORDER — AZITHROMYCIN 250 MG/1
250 TABLET, FILM COATED ORAL SEE ADMIN INSTRUCTIONS
Qty: 6 TABLET | Refills: 0 | OUTPATIENT
Start: 2023-05-15 | End: 2023-05-20

## 2023-05-16 RX ORDER — AZITHROMYCIN 250 MG/1
250 TABLET, FILM COATED ORAL SEE ADMIN INSTRUCTIONS
Qty: 6 TABLET | Refills: 0 | OUTPATIENT
Start: 2023-05-16 | End: 2023-05-21

## 2023-05-22 DIAGNOSIS — R11.0 NAUSEA: ICD-10-CM

## 2023-05-23 DIAGNOSIS — M25.551 RIGHT HIP PAIN: ICD-10-CM

## 2023-05-23 DIAGNOSIS — M51.37 DEGENERATIVE DISC DISEASE AT L5-S1 LEVEL: ICD-10-CM

## 2023-05-23 RX ORDER — ONDANSETRON 4 MG/1
4 TABLET, ORALLY DISINTEGRATING ORAL 3 TIMES DAILY PRN
Qty: 21 TABLET | Refills: 0 | OUTPATIENT
Start: 2023-05-23

## 2023-05-23 RX ORDER — ONDANSETRON 4 MG/1
4 TABLET, ORALLY DISINTEGRATING ORAL 3 TIMES DAILY PRN
Qty: 21 TABLET | Refills: 1 | Status: SHIPPED | OUTPATIENT
Start: 2023-05-23

## 2023-05-26 DIAGNOSIS — M25.551 RIGHT HIP PAIN: ICD-10-CM

## 2023-05-26 DIAGNOSIS — M51.37 DEGENERATIVE DISC DISEASE AT L5-S1 LEVEL: ICD-10-CM

## 2023-05-30 DIAGNOSIS — M51.37 DEGENERATIVE DISC DISEASE AT L5-S1 LEVEL: ICD-10-CM

## 2023-05-30 DIAGNOSIS — M25.551 RIGHT HIP PAIN: ICD-10-CM

## 2023-05-30 RX ORDER — OXYCODONE HYDROCHLORIDE AND ACETAMINOPHEN 5; 325 MG/1; MG/1
1 TABLET ORAL EVERY 6 HOURS PRN
Qty: 120 TABLET | Refills: 0 | OUTPATIENT
Start: 2023-05-30 | End: 2023-06-29

## 2023-05-30 RX ORDER — OXYCODONE HYDROCHLORIDE AND ACETAMINOPHEN 5; 325 MG/1; MG/1
1 TABLET ORAL EVERY 6 HOURS PRN
Qty: 120 TABLET | Refills: 0 | Status: SHIPPED | OUTPATIENT
Start: 2023-05-30 | End: 2023-06-29

## 2023-05-30 NOTE — TELEPHONE ENCOUNTER
LOV 5/10/23  LRF 5/10/23    Next appt 8/10/23      Health Maintenance   Topic Date Due    Diabetic retinal exam  Never done    Flu vaccine (Season Ended) 09/29/2023 (Originally 8/1/2023)    Hepatitis B vaccine (1 of 3 - Risk 3-dose series) 11/03/2023 (Originally 6/15/1997)    Pneumococcal 0-64 years Vaccine (1 - PCV) 11/03/2023 (Originally 6/15/1984)    COVID-19 Vaccine (2 - Pfizer series) 01/06/2024 (Originally 5/20/2021)    HIV screen  01/06/2024 (Originally 6/15/1993)    Lipids  09/29/2023    GFR test (Diabetes, CKD 3-4, OR last GFR 15-59)  09/29/2023    Diabetic Alb to Cr ratio (uACR) test  01/06/2024    Diabetic foot exam  05/10/2024    A1C test (Diabetic or Prediabetic)  05/10/2024    Depression Monitoring  05/10/2024    DTaP/Tdap/Td vaccine (4 - Td or Tdap) 04/08/2028    Hepatitis A vaccine  Aged Out    Hib vaccine  Aged Out    Meningococcal (ACWY) vaccine  Aged Out    Depression Screen  Discontinued    Diabetes screen  Discontinued    Hepatitis C screen  Discontinued             (applicable per patient's age: Cancer Screenings, Depression Screening, Fall Risk Screening, Immunizations)    Hemoglobin A1C (%)   Date Value   05/10/2023 8.6 (H)   01/06/2023 10.1   09/29/2022 13.1 (H)     Microalb/Crt. Ratio (mcg/mg creat)   Date Value   01/06/2023 12     LDL Cholesterol (mg/dL)   Date Value   09/29/2022 134 (H)     AST (U/L)   Date Value   09/29/2022 28     ALT (U/L)   Date Value   09/29/2022 20     BUN (mg/dL)   Date Value   09/29/2022 11      (goal A1C is < 7)   (goal LDL is <100) need 30-50% reduction from baseline     BP Readings from Last 3 Encounters:   05/10/23 138/82   01/06/23 132/82   11/03/22 124/80    (goal /80)      All Future Testing planned in [facility]:      Next Visit Date:  Future Appointments   Date Time Provider Veena Stanley   8/10/2023  1:00 PM Rebecca Hess, APRN - CNP [facility]            There is no problem list on file for this patient.

## 2023-05-31 DIAGNOSIS — F41.9 ANXIETY: ICD-10-CM

## 2023-05-31 DIAGNOSIS — F33.0 MILD EPISODE OF RECURRENT MAJOR DEPRESSIVE DISORDER (HCC): ICD-10-CM

## 2023-05-31 RX ORDER — CITALOPRAM 20 MG/1
20 TABLET ORAL DAILY
Qty: 30 TABLET | Refills: 5 | Status: SHIPPED | OUTPATIENT
Start: 2023-05-31 | End: 2023-07-05

## 2023-06-03 DIAGNOSIS — R11.0 NAUSEA: ICD-10-CM

## 2023-06-05 DIAGNOSIS — R11.0 NAUSEA: ICD-10-CM

## 2023-06-07 RX ORDER — ONDANSETRON 4 MG/1
4 TABLET, ORALLY DISINTEGRATING ORAL 3 TIMES DAILY PRN
Qty: 21 TABLET | Refills: 1 | OUTPATIENT
Start: 2023-06-07

## 2023-06-12 DIAGNOSIS — L30.9 ACUTE DERMATITIS: ICD-10-CM

## 2023-06-12 DIAGNOSIS — B36.9 FUNGAL DERMATITIS: ICD-10-CM

## 2023-06-12 DIAGNOSIS — H66.001 NON-RECURRENT ACUTE SUPPURATIVE OTITIS MEDIA OF RIGHT EAR WITHOUT SPONTANEOUS RUPTURE OF TYMPANIC MEMBRANE: ICD-10-CM

## 2023-06-12 DIAGNOSIS — J02.0 ACUTE STREPTOCOCCAL PHARYNGITIS: ICD-10-CM

## 2023-06-12 DIAGNOSIS — F33.0 MILD EPISODE OF RECURRENT MAJOR DEPRESSIVE DISORDER (HCC): ICD-10-CM

## 2023-06-12 DIAGNOSIS — F41.9 ANXIETY: ICD-10-CM

## 2023-06-12 RX ORDER — CITALOPRAM 20 MG/1
TABLET ORAL
Qty: 30 TABLET | Refills: 5 | OUTPATIENT
Start: 2023-06-12

## 2023-06-16 DIAGNOSIS — M51.37 DEGENERATIVE DISC DISEASE AT L5-S1 LEVEL: ICD-10-CM

## 2023-06-16 DIAGNOSIS — R11.0 NAUSEA: ICD-10-CM

## 2023-06-16 DIAGNOSIS — M25.551 RIGHT HIP PAIN: ICD-10-CM

## 2023-06-16 RX ORDER — ONDANSETRON 4 MG/1
4 TABLET, ORALLY DISINTEGRATING ORAL 3 TIMES DAILY PRN
Qty: 21 TABLET | Refills: 1 | OUTPATIENT
Start: 2023-06-16

## 2023-06-17 DIAGNOSIS — R11.0 NAUSEA: ICD-10-CM

## 2023-06-18 DIAGNOSIS — R11.0 NAUSEA: ICD-10-CM

## 2023-06-19 DIAGNOSIS — R11.0 NAUSEA: ICD-10-CM

## 2023-06-19 RX ORDER — ONDANSETRON 4 MG/1
4 TABLET, ORALLY DISINTEGRATING ORAL 3 TIMES DAILY PRN
Qty: 21 TABLET | Refills: 1 | OUTPATIENT
Start: 2023-06-19

## 2023-06-19 NOTE — TELEPHONE ENCOUNTER
LOV 5/10/23   RTO  8/10/23 NTP WG   LRF 5/23/23          Controlled Substance Monitoring:    Acute and Chronic Pain Monitoring:   No flowsheet data found.

## 2023-06-20 DIAGNOSIS — M51.37 DEGENERATIVE DISC DISEASE AT L5-S1 LEVEL: ICD-10-CM

## 2023-06-20 DIAGNOSIS — R11.0 NAUSEA: ICD-10-CM

## 2023-06-20 DIAGNOSIS — M25.551 RIGHT HIP PAIN: ICD-10-CM

## 2023-06-20 RX ORDER — ONDANSETRON 4 MG/1
4 TABLET, ORALLY DISINTEGRATING ORAL 3 TIMES DAILY PRN
Qty: 21 TABLET | Refills: 1 | OUTPATIENT
Start: 2023-06-20

## 2023-06-20 RX ORDER — ONDANSETRON 4 MG/1
4 TABLET, ORALLY DISINTEGRATING ORAL 3 TIMES DAILY PRN
Qty: 21 TABLET | Refills: 1 | Status: SHIPPED | OUTPATIENT
Start: 2023-06-20

## 2023-06-20 RX ORDER — ONDANSETRON 4 MG/1
TABLET, ORALLY DISINTEGRATING ORAL
Qty: 21 TABLET | Refills: 1 | OUTPATIENT
Start: 2023-06-20

## 2023-06-20 RX ORDER — OXYCODONE HYDROCHLORIDE AND ACETAMINOPHEN 5; 325 MG/1; MG/1
1 TABLET ORAL EVERY 6 HOURS PRN
Qty: 120 TABLET | Refills: 0 | OUTPATIENT
Start: 2023-06-20 | End: 2023-07-20

## 2023-06-21 DIAGNOSIS — M25.551 RIGHT HIP PAIN: ICD-10-CM

## 2023-06-21 DIAGNOSIS — B02.9 HERPES ZOSTER WITHOUT COMPLICATION: Primary | ICD-10-CM

## 2023-06-21 DIAGNOSIS — M51.37 DEGENERATIVE DISC DISEASE AT L5-S1 LEVEL: ICD-10-CM

## 2023-06-21 RX ORDER — VALACYCLOVIR HYDROCHLORIDE 1 G/1
1000 TABLET, FILM COATED ORAL 3 TIMES DAILY
Qty: 21 TABLET | Refills: 0 | Status: SHIPPED | OUTPATIENT
Start: 2023-06-21 | End: 2023-06-28

## 2023-06-21 NOTE — TELEPHONE ENCOUNTER
LOV 5/10/23  LRF  5/30/23  RTO 3 months, Scheduled      Health Maintenance   Topic Date Due    Diabetic retinal exam  Never done    Colorectal Cancer Screen  06/15/2023    Flu vaccine (Season Ended) 09/29/2023 (Originally 8/1/2023)    Hepatitis B vaccine (1 of 3 - Risk 3-dose series) 11/03/2023 (Originally 6/15/1997)    Pneumococcal 0-64 years Vaccine (1 - PCV) 11/03/2023 (Originally 6/15/1984)    COVID-19 Vaccine (2 - Pfizer series) 01/06/2024 (Originally 6/24/2021)    HIV screen  01/06/2024 (Originally 6/15/1993)    Lipids  09/29/2023    GFR test (Diabetes, CKD 3-4, OR last GFR 15-59)  09/29/2023    Diabetic Alb to Cr ratio (uACR) test  01/06/2024    Diabetic foot exam  05/10/2024    A1C test (Diabetic or Prediabetic)  05/10/2024    Depression Monitoring  05/10/2024    DTaP/Tdap/Td vaccine (4 - Td or Tdap) 04/08/2028    Hepatitis A vaccine  Aged Out    Hib vaccine  Aged Out    Meningococcal (ACWY) vaccine  Aged Out    Depression Screen  Discontinued    Diabetes screen  Discontinued    Hepatitis C screen  Discontinued             (applicable per patient's age: Cancer Screenings, Depression Screening, Fall Risk Screening, Immunizations)    Hemoglobin A1C (%)   Date Value   05/10/2023 8.6 (H)   01/06/2023 10.1   09/29/2022 13.1 (H)     Microalb/Crt.  Ratio (mcg/mg creat)   Date Value   01/06/2023 12     LDL Cholesterol (mg/dL)   Date Value   09/29/2022 134 (H)     AST (U/L)   Date Value   09/29/2022 28     ALT (U/L)   Date Value   09/29/2022 20     BUN (mg/dL)   Date Value   09/29/2022 11      (goal A1C is < 7)   (goal LDL is <100) need 30-50% reduction from baseline     BP Readings from Last 3 Encounters:   05/10/23 138/82   01/06/23 132/82   11/03/22 124/80    (goal /80)      All Future Testing planned in [facility]:      Next Visit Date:  Future Appointments   Date Time Provider Veena Stanley   8/10/2023  1:00 PM VIVIENNE Stallworth - CNP [facility]            There is no problem list on file

## 2023-06-22 RX ORDER — OXYCODONE HYDROCHLORIDE AND ACETAMINOPHEN 5; 325 MG/1; MG/1
1 TABLET ORAL EVERY 6 HOURS PRN
Qty: 120 TABLET | Refills: 0 | OUTPATIENT
Start: 2023-06-22 | End: 2023-07-22

## 2023-06-23 RX ORDER — FLUCONAZOLE 150 MG/1
TABLET ORAL
Qty: 3 TABLET | Refills: 0 | Status: SHIPPED | OUTPATIENT
Start: 2023-06-23

## 2023-06-23 RX ORDER — OXYCODONE HYDROCHLORIDE AND ACETAMINOPHEN 5; 325 MG/1; MG/1
1 TABLET ORAL EVERY 6 HOURS PRN
Qty: 120 TABLET | Refills: 0 | Status: SHIPPED | OUTPATIENT
Start: 2023-06-23 | End: 2023-07-23

## 2023-06-23 RX ORDER — AZITHROMYCIN 250 MG/1
TABLET, FILM COATED ORAL
Qty: 6 TABLET | Refills: 0 | Status: SHIPPED | OUTPATIENT
Start: 2023-06-23

## 2023-06-27 DIAGNOSIS — I10 PRIMARY HYPERTENSION: ICD-10-CM

## 2023-06-27 RX ORDER — LISINOPRIL AND HYDROCHLOROTHIAZIDE 25; 20 MG/1; MG/1
1 TABLET ORAL DAILY
Qty: 90 TABLET | Refills: 3 | Status: SHIPPED | OUTPATIENT
Start: 2023-06-27 | End: 2024-06-26

## 2023-06-28 ENCOUNTER — TELEPHONE (OUTPATIENT)
Dept: PRIMARY CARE CLINIC | Age: 43
End: 2023-06-28

## 2023-06-28 DIAGNOSIS — R11.0 NAUSEA: ICD-10-CM

## 2023-06-29 RX ORDER — ONDANSETRON 4 MG/1
4 TABLET, ORALLY DISINTEGRATING ORAL 3 TIMES DAILY PRN
Qty: 21 TABLET | Refills: 1 | Status: SHIPPED | OUTPATIENT
Start: 2023-06-29

## 2023-07-05 DIAGNOSIS — M25.551 RIGHT HIP PAIN: ICD-10-CM

## 2023-07-05 DIAGNOSIS — M51.37 DEGENERATIVE DISC DISEASE AT L5-S1 LEVEL: ICD-10-CM

## 2023-07-05 DIAGNOSIS — F41.9 ANXIETY: ICD-10-CM

## 2023-07-05 DIAGNOSIS — F33.0 MILD EPISODE OF RECURRENT MAJOR DEPRESSIVE DISORDER (HCC): ICD-10-CM

## 2023-07-05 RX ORDER — OXYCODONE HYDROCHLORIDE AND ACETAMINOPHEN 5; 325 MG/1; MG/1
1 TABLET ORAL EVERY 6 HOURS PRN
Qty: 120 TABLET | Refills: 0 | OUTPATIENT
Start: 2023-07-05 | End: 2023-08-04

## 2023-07-05 RX ORDER — CITALOPRAM 20 MG/1
TABLET ORAL
Qty: 30 TABLET | Refills: 0 | Status: SHIPPED | OUTPATIENT
Start: 2023-07-05 | End: 2023-08-21

## 2023-07-05 NOTE — TELEPHONE ENCOUNTER
Reviewed last note. Patient was advised to follow-up with Ortho at last visit in May and hasn't yet.

## 2023-07-05 NOTE — TELEPHONE ENCOUNTER
LOV 5/10/2023     RTO 8/10/2023  LRF 5/31/2023          Controlled Substance Monitoring:    Acute and Chronic Pain Monitoring:   No flowsheet data found.

## 2023-07-05 NOTE — TELEPHONE ENCOUNTER
LOV  LRF 23 Pharmacist stated that the script written on 23 will  on Friday before the medication is able to be filled per insurance. New order required.

## 2023-07-06 DIAGNOSIS — M25.551 RIGHT HIP PAIN: ICD-10-CM

## 2023-07-06 DIAGNOSIS — M51.37 DEGENERATIVE DISC DISEASE AT L5-S1 LEVEL: ICD-10-CM

## 2023-07-06 RX ORDER — OXYCODONE HYDROCHLORIDE AND ACETAMINOPHEN 5; 325 MG/1; MG/1
1 TABLET ORAL EVERY 6 HOURS PRN
Qty: 120 TABLET | Refills: 0 | OUTPATIENT
Start: 2023-07-06 | End: 2023-08-05

## 2023-07-06 NOTE — TELEPHONE ENCOUNTER
Please look at other encounters prior to sending new ones. This is addressed in yesterday's encounter. Patient was to follow up with Ortho and has not done so yet. Please follow up with patient to see if they have an ortho appt.

## 2023-07-06 NOTE — TELEPHONE ENCOUNTER
LOV 5/10/23  RTO 8/10/23  LRF 6/23/23          Controlled Substance Monitoring:    Acute and Chronic Pain Monitoring:   No flowsheet data found.

## 2023-07-06 NOTE — TELEPHONE ENCOUNTER
Spoke with pt she has upcoming ortho appt next week in [place]. She doesn't know the name of the office,  pt was in a car, but will make sure they send the office notes.

## 2023-07-07 RX ORDER — OXYCODONE HYDROCHLORIDE AND ACETAMINOPHEN 5; 325 MG/1; MG/1
1 TABLET ORAL EVERY 6 HOURS PRN
Qty: 120 TABLET | Refills: 0 | OUTPATIENT
Start: 2023-07-07 | End: 2023-08-06

## 2023-07-09 DIAGNOSIS — R11.0 NAUSEA: ICD-10-CM

## 2023-07-10 DIAGNOSIS — R11.0 NAUSEA: ICD-10-CM

## 2023-07-10 RX ORDER — ONDANSETRON 4 MG/1
4 TABLET, ORALLY DISINTEGRATING ORAL 3 TIMES DAILY PRN
Qty: 21 TABLET | Refills: 1 | OUTPATIENT
Start: 2023-07-10

## 2023-07-11 DIAGNOSIS — R11.0 NAUSEA: ICD-10-CM

## 2023-07-11 RX ORDER — ONDANSETRON 4 MG/1
4 TABLET, ORALLY DISINTEGRATING ORAL 3 TIMES DAILY PRN
Qty: 21 TABLET | Refills: 1 | OUTPATIENT
Start: 2023-07-11

## 2023-07-11 RX ORDER — ONDANSETRON 4 MG/1
TABLET, ORALLY DISINTEGRATING ORAL
Qty: 21 TABLET | Refills: 1 | OUTPATIENT
Start: 2023-07-11

## 2023-07-11 NOTE — TELEPHONE ENCOUNTER
Writer called pt to inform she should have refill. Pt claiming she does not, writer suggested she be seen at an urgent care then for the nausea if it is this severe.

## 2023-07-11 NOTE — TELEPHONE ENCOUNTER
3rd request sent for same medication.   Please stop sending and respond to message rounted to clinical staff

## 2023-07-11 NOTE — TELEPHONE ENCOUNTER
LOV 5-10-23  RTO 8-10-23  LRF 6-29-23          Controlled Substance Monitoring:    Acute and Chronic Pain Monitoring:   No flowsheet data found.

## 2023-07-11 NOTE — TELEPHONE ENCOUNTER
LOV  5/10/23  RTO 8/10/23  LRF 6/29/23          Controlled Substance Monitoring:    Acute and Chronic Pain Monitoring:   No flowsheet data found.

## 2023-07-11 NOTE — TELEPHONE ENCOUNTER
Patient has refill still from last RX sent by Michele Collier- please advise patient to request refill from pharmacy

## 2023-07-12 DIAGNOSIS — M25.551 RIGHT HIP PAIN: ICD-10-CM

## 2023-07-12 DIAGNOSIS — M51.37 DEGENERATIVE DISC DISEASE AT L5-S1 LEVEL: ICD-10-CM

## 2023-07-12 RX ORDER — OXYCODONE HYDROCHLORIDE AND ACETAMINOPHEN 5; 325 MG/1; MG/1
1 TABLET ORAL EVERY 6 HOURS PRN
Qty: 120 TABLET | Refills: 0 | Status: CANCELLED | OUTPATIENT
Start: 2023-07-12 | End: 2023-08-11

## 2023-07-14 ASSESSMENT — ENCOUNTER SYMPTOMS
SHORTNESS OF BREATH: 0
ABDOMINAL PAIN: 0
VOMITING: 0
SORE THROAT: 0
RHINORRHEA: 0
CHEST TIGHTNESS: 0
NAUSEA: 0
ABDOMINAL DISTENTION: 0
COUGH: 0
DIARRHEA: 0
CONSTIPATION: 0

## 2023-07-17 ENCOUNTER — OFFICE VISIT (OUTPATIENT)
Dept: FAMILY MEDICINE CLINIC | Age: 43
End: 2023-07-17
Payer: COMMERCIAL

## 2023-07-17 VITALS
HEIGHT: 67 IN | HEART RATE: 92 BPM | OXYGEN SATURATION: 98 % | SYSTOLIC BLOOD PRESSURE: 118 MMHG | TEMPERATURE: 97.9 F | WEIGHT: 271 LBS | DIASTOLIC BLOOD PRESSURE: 76 MMHG | BODY MASS INDEX: 42.53 KG/M2

## 2023-07-17 DIAGNOSIS — Z00.00 PREVENTATIVE HEALTH CARE: ICD-10-CM

## 2023-07-17 DIAGNOSIS — I10 PRIMARY HYPERTENSION: Primary | ICD-10-CM

## 2023-07-17 DIAGNOSIS — M25.551 RIGHT HIP PAIN: ICD-10-CM

## 2023-07-17 DIAGNOSIS — Z12.31 SCREENING MAMMOGRAM FOR HIGH-RISK PATIENT: ICD-10-CM

## 2023-07-17 DIAGNOSIS — G47.00 INSOMNIA, UNSPECIFIED TYPE: ICD-10-CM

## 2023-07-17 DIAGNOSIS — E11.9 TYPE 2 DIABETES MELLITUS WITHOUT COMPLICATION, WITH LONG-TERM CURRENT USE OF INSULIN (HCC): ICD-10-CM

## 2023-07-17 DIAGNOSIS — M51.37 DEGENERATIVE DISC DISEASE AT L5-S1 LEVEL: ICD-10-CM

## 2023-07-17 DIAGNOSIS — G25.81 RLS (RESTLESS LEGS SYNDROME): ICD-10-CM

## 2023-07-17 DIAGNOSIS — F41.9 ANXIETY: ICD-10-CM

## 2023-07-17 DIAGNOSIS — Z79.4 TYPE 2 DIABETES MELLITUS WITHOUT COMPLICATION, WITH LONG-TERM CURRENT USE OF INSULIN (HCC): ICD-10-CM

## 2023-07-17 DIAGNOSIS — G89.29 CHRONIC BILATERAL LOW BACK PAIN WITHOUT SCIATICA: ICD-10-CM

## 2023-07-17 DIAGNOSIS — Z76.89 ENCOUNTER TO ESTABLISH CARE: ICD-10-CM

## 2023-07-17 DIAGNOSIS — E78.2 MIXED HYPERLIPIDEMIA: ICD-10-CM

## 2023-07-17 DIAGNOSIS — M54.50 CHRONIC BILATERAL LOW BACK PAIN WITHOUT SCIATICA: ICD-10-CM

## 2023-07-17 DIAGNOSIS — F33.0 MILD EPISODE OF RECURRENT MAJOR DEPRESSIVE DISORDER (HCC): ICD-10-CM

## 2023-07-17 DIAGNOSIS — Z12.11 COLON CANCER SCREENING: ICD-10-CM

## 2023-07-17 PROCEDURE — 3074F SYST BP LT 130 MM HG: CPT | Performed by: NURSE PRACTITIONER

## 2023-07-17 PROCEDURE — G8417 CALC BMI ABV UP PARAM F/U: HCPCS | Performed by: NURSE PRACTITIONER

## 2023-07-17 PROCEDURE — 99204 OFFICE O/P NEW MOD 45 MIN: CPT | Performed by: NURSE PRACTITIONER

## 2023-07-17 PROCEDURE — G8427 DOCREV CUR MEDS BY ELIG CLIN: HCPCS | Performed by: NURSE PRACTITIONER

## 2023-07-17 PROCEDURE — 2022F DILAT RTA XM EVC RTNOPTHY: CPT | Performed by: NURSE PRACTITIONER

## 2023-07-17 PROCEDURE — 1036F TOBACCO NON-USER: CPT | Performed by: NURSE PRACTITIONER

## 2023-07-17 PROCEDURE — 3052F HG A1C>EQUAL 8.0%<EQUAL 9.0%: CPT | Performed by: NURSE PRACTITIONER

## 2023-07-17 PROCEDURE — 3078F DIAST BP <80 MM HG: CPT | Performed by: NURSE PRACTITIONER

## 2023-07-17 RX ORDER — OXYCODONE HYDROCHLORIDE AND ACETAMINOPHEN 5; 325 MG/1; MG/1
1 TABLET ORAL EVERY 6 HOURS PRN
Qty: 42 TABLET | Refills: 0 | Status: SHIPPED | OUTPATIENT
Start: 2023-07-17 | End: 2023-07-24

## 2023-07-17 RX ORDER — DULAGLUTIDE 3 MG/.5ML
3 INJECTION, SOLUTION SUBCUTANEOUS WEEKLY
Qty: 3 ADJUSTABLE DOSE PRE-FILLED PEN SYRINGE | Refills: 3 | Status: SHIPPED | OUTPATIENT
Start: 2023-07-17

## 2023-07-17 SDOH — ECONOMIC STABILITY: FOOD INSECURITY: WITHIN THE PAST 12 MONTHS, YOU WORRIED THAT YOUR FOOD WOULD RUN OUT BEFORE YOU GOT MONEY TO BUY MORE.: NEVER TRUE

## 2023-07-17 SDOH — ECONOMIC STABILITY: INCOME INSECURITY: HOW HARD IS IT FOR YOU TO PAY FOR THE VERY BASICS LIKE FOOD, HOUSING, MEDICAL CARE, AND HEATING?: NOT HARD AT ALL

## 2023-07-17 SDOH — ECONOMIC STABILITY: FOOD INSECURITY: WITHIN THE PAST 12 MONTHS, THE FOOD YOU BOUGHT JUST DIDN'T LAST AND YOU DIDN'T HAVE MONEY TO GET MORE.: NEVER TRUE

## 2023-07-17 ASSESSMENT — PATIENT HEALTH QUESTIONNAIRE - PHQ9
7. TROUBLE CONCENTRATING ON THINGS, SUCH AS READING THE NEWSPAPER OR WATCHING TELEVISION: 0
SUM OF ALL RESPONSES TO PHQ QUESTIONS 1-9: 0
9. THOUGHTS THAT YOU WOULD BE BETTER OFF DEAD, OR OF HURTING YOURSELF: 0
1. LITTLE INTEREST OR PLEASURE IN DOING THINGS: 0
SUM OF ALL RESPONSES TO PHQ QUESTIONS 1-9: 0
10. IF YOU CHECKED OFF ANY PROBLEMS, HOW DIFFICULT HAVE THESE PROBLEMS MADE IT FOR YOU TO DO YOUR WORK, TAKE CARE OF THINGS AT HOME, OR GET ALONG WITH OTHER PEOPLE: 0
6. FEELING BAD ABOUT YOURSELF - OR THAT YOU ARE A FAILURE OR HAVE LET YOURSELF OR YOUR FAMILY DOWN: 0
5. POOR APPETITE OR OVEREATING: 0
SUM OF ALL RESPONSES TO PHQ QUESTIONS 1-9: 0
SUM OF ALL RESPONSES TO PHQ9 QUESTIONS 1 & 2: 0
3. TROUBLE FALLING OR STAYING ASLEEP: 0
SUM OF ALL RESPONSES TO PHQ QUESTIONS 1-9: 0
4. FEELING TIRED OR HAVING LITTLE ENERGY: 0
8. MOVING OR SPEAKING SO SLOWLY THAT OTHER PEOPLE COULD HAVE NOTICED. OR THE OPPOSITE, BEING SO FIGETY OR RESTLESS THAT YOU HAVE BEEN MOVING AROUND A LOT MORE THAN USUAL: 0
2. FEELING DOWN, DEPRESSED OR HOPELESS: 0

## 2023-07-17 ASSESSMENT — ENCOUNTER SYMPTOMS: BACK PAIN: 1

## 2023-07-24 ENCOUNTER — TELEPHONE (OUTPATIENT)
Dept: FAMILY MEDICINE CLINIC | Age: 43
End: 2023-07-24

## 2023-07-24 DIAGNOSIS — M51.37 DEGENERATIVE DISC DISEASE AT L5-S1 LEVEL: ICD-10-CM

## 2023-07-24 DIAGNOSIS — M25.551 RIGHT HIP PAIN: ICD-10-CM

## 2023-07-24 RX ORDER — OXYCODONE HYDROCHLORIDE AND ACETAMINOPHEN 5; 325 MG/1; MG/1
1 TABLET ORAL EVERY 6 HOURS PRN
Qty: 28 TABLET | Refills: 0 | Status: SHIPPED | OUTPATIENT
Start: 2023-07-24 | End: 2023-07-31

## 2023-07-24 NOTE — TELEPHONE ENCOUNTER
I had a very extensive discussion with patient at her appointment that I will not be managing chronic pain. I did tell her that the prescription of Percocet that I was giving her was to last. I gave her 42 pills. .. which if taking correctly, every 6 hours, should have lasted her through 7/28. I do see that she has an upcoming appointment scheduled with pain management. I will send another 7 days of the Percocet (#28 pills) but please re-iterate to both mom and patient, that this will be the LAST Percocet prescription I will be sending. The prescription is written for AS NEEDED. It is not a scheduled medication. I would recommend only taking the medication for severe pain and supplementing with ibuprofen in between. Again, this will be her last prescription from me, as I do not manage chronic pain. This was explained to her in detail at her appointment and she did verbalize understanding.

## 2023-07-24 NOTE — TELEPHONE ENCOUNTER
Patient was seen on 07/24/23 and given 7 days worth of pain medication. She can't get into pain management until 08/21/23 and her mom states she has to have something for pain now. She is taking 4 pills/day. She asked when the patients next appointment was here and I told her not until 10/12/2023 but that she has an appointment with another provider to establish care on 08/10/2023. She said they were going to keep that but that office said she can't come there because all of her records have been sent to our office.

## 2023-07-30 DIAGNOSIS — M51.37 DEGENERATIVE DISC DISEASE AT L5-S1 LEVEL: ICD-10-CM

## 2023-07-30 DIAGNOSIS — M25.551 RIGHT HIP PAIN: ICD-10-CM

## 2023-07-31 DIAGNOSIS — M25.551 RIGHT HIP PAIN: ICD-10-CM

## 2023-07-31 DIAGNOSIS — M51.37 DEGENERATIVE DISC DISEASE AT L5-S1 LEVEL: ICD-10-CM

## 2023-07-31 RX ORDER — OXYCODONE HYDROCHLORIDE AND ACETAMINOPHEN 5; 325 MG/1; MG/1
1 TABLET ORAL EVERY 6 HOURS PRN
Qty: 28 TABLET | Refills: 0 | OUTPATIENT
Start: 2023-07-31 | End: 2023-08-07

## 2023-07-31 RX ORDER — OXYCODONE HYDROCHLORIDE AND ACETAMINOPHEN 5; 325 MG/1; MG/1
1 TABLET ORAL EVERY 6 HOURS PRN
Qty: 28 TABLET | Refills: 0 | Status: CANCELLED | OUTPATIENT
Start: 2023-07-31 | End: 2023-08-07

## 2023-07-31 NOTE — TELEPHONE ENCOUNTER
Pt was informed that no further refills will be sent. This is a \"as needed\" medication. She will need to follow up with her new PCP on 8/10/23 for further refills.

## 2023-07-31 NOTE — TELEPHONE ENCOUNTER
Needs a refill until her appt with Pain Mgmt on Aug 21st. Has enough until Wednesday.   Last: 7/17/23  Next: 10/12/23

## 2023-08-04 ENCOUNTER — TELEPHONE (OUTPATIENT)
Dept: FAMILY MEDICINE CLINIC | Age: 43
End: 2023-08-04

## 2023-08-04 NOTE — TELEPHONE ENCOUNTER
Mother called again requesting Alna Aubree her that we are unable to send anymore for her and to follow up with ER if symptoms worsen. Pt was advised what we can not send it. She states understanding.

## 2023-08-10 ENCOUNTER — HOSPITAL ENCOUNTER (EMERGENCY)
Age: 43
Discharge: HOME | End: 2023-08-11
Attending: EMERGENCY MEDICINE
Payer: COMMERCIAL

## 2023-08-10 VITALS
TEMPERATURE: 97.3 F | SYSTOLIC BLOOD PRESSURE: 131 MMHG | HEIGHT: 66 IN | BODY MASS INDEX: 40.75 KG/M2 | DIASTOLIC BLOOD PRESSURE: 89 MMHG | WEIGHT: 253.53 LBS | RESPIRATION RATE: 20 BRPM | HEART RATE: 110 BPM | OXYGEN SATURATION: 97 %

## 2023-08-10 DIAGNOSIS — F11.93 OPIOID WITHDRAWAL (HCC): ICD-10-CM

## 2023-08-10 DIAGNOSIS — F41.1 ANXIETY STATE: Primary | ICD-10-CM

## 2023-08-10 PROCEDURE — 96372 THER/PROPH/DIAG INJ SC/IM: CPT

## 2023-08-10 PROCEDURE — 99284 EMERGENCY DEPT VISIT MOD MDM: CPT

## 2023-08-10 PROCEDURE — 6360000002 HC RX W HCPCS: Performed by: EMERGENCY MEDICINE

## 2023-08-10 PROCEDURE — 6370000000 HC RX 637 (ALT 250 FOR IP): Performed by: EMERGENCY MEDICINE

## 2023-08-10 RX ORDER — LORAZEPAM 2 MG/ML
2 INJECTION INTRAMUSCULAR ONCE
Status: COMPLETED | OUTPATIENT
Start: 2023-08-10 | End: 2023-08-10

## 2023-08-10 RX ORDER — CLONIDINE HYDROCHLORIDE 0.1 MG/1
0.1 TABLET ORAL ONCE
Status: COMPLETED | OUTPATIENT
Start: 2023-08-10 | End: 2023-08-10

## 2023-08-10 RX ORDER — HYDROXYZINE HYDROCHLORIDE 25 MG/1
25 TABLET, FILM COATED ORAL 3 TIMES DAILY PRN
COMMUNITY

## 2023-08-10 RX ADMIN — LORAZEPAM 2 MG: 2 INJECTION INTRAMUSCULAR; INTRAVENOUS at 22:46

## 2023-08-10 RX ADMIN — CLONIDINE HYDROCHLORIDE 0.1 MG: 0.1 TABLET ORAL at 22:46

## 2023-08-10 ASSESSMENT — PAIN DESCRIPTION - LOCATION: LOCATION: HIP;BACK

## 2023-08-10 ASSESSMENT — PAIN DESCRIPTION - PAIN TYPE: TYPE: CHRONIC PAIN

## 2023-08-10 ASSESSMENT — PAIN - FUNCTIONAL ASSESSMENT: PAIN_FUNCTIONAL_ASSESSMENT: 0-10

## 2023-08-11 RX ORDER — LORAZEPAM 1 MG/1
TABLET ORAL
Qty: 6 TABLET | Refills: 0 | Status: SHIPPED | OUTPATIENT
Start: 2023-08-11 | End: 2023-08-11

## 2023-08-11 NOTE — ED PROVIDER NOTES
eMERGENCY dEPARTMENT eNCOUnter      Pt Name: Jaimee Suarez  MRN: [id number]  [address] [facility] [place] 1980  Date of evaluation: 8/10/2023      CHIEF COMPLAINT       Chief Complaint   Patient presents with    Tremors     Pt complains of \"tremors\" and spasms in her legs. Pt describes chronic back pain and has been without Percocet for about 4 days. Pain management appointment for 8/15/2023. Given Hydroxyzine 25mg today at Urgent Care for symptoms, but isn't helping. HISTORY OF PRESENT ILLNESS    Jaimee Suarez is a 37 y.o. female who presents to the emergency department for evaluation of tremors and spasms in her leg patient has been on chronic pain medicines for a long time and has run out of her Percocet for about the last 4 days. She states that she did manage to find 1 Percocet on the floor yesterday which she took and complains that she now has tremors and anxiety. Has no abdominal pain has no diarrhea. Vital signs are normal with exception of a pulse of 110. Patient does not have a possible appointment on the 15th with pain management. She was given hydroxyzine at urgent care but she says it is not helping. REVIEW OF SYSTEMS       Constitutional: No fevers or chills  HEENT: No sore throat, rhinorrhea, or earache  Eyes: No blurry vision or double vision no drainage  Cardiovascular: No chest pain or tachycardia  Respiratory: No wheezing or shortness of breath no cough  Gastrointestinal: No nausea, vomiting, diarrhea, constipation, or abdominal pain   : No hematuria or dysuria  Musculoskeletal: No swelling or pain  Skin: No rash   Neurological: No focal neurologic complaints, paresthesias, weakness, or headache. Has some tremor. PAST MEDICAL HISTORY    has a past medical history of Diabetes mellitus ([address]), Hernia of anterior abdominal wall, Hypertension, and Restless leg syndrome.     SURGICAL HISTORY      has a past surgical history that includes Appendectomy; back surgery; and DISPOSITION/PLAN   DISPOSITION Decision To Discharge  I have reviewed the disposition diagnosis with the patient and or their family/guardian. I have answered their questions and given discharge instructions. They voiced understanding of these instructions and did not have any further questions or complaints. Reevaluation: Patient is gotten good relief from benzodiazepine and from clonidine patient will be discharged home we will give her a short supply of benzo on a taper for the next 2 to 3 days and then she can be seen by her own doctor. She is stable for discharge home.     PATIENT REFERRED TO:  VIVIENNE Villafana NP  [address]  [address] [address] [phone]    In 2 days        DISCHARGE MEDICATIONS:  New Prescriptions    LORAZEPAM (ATIVAN) 1 MG TABLET    TID for 1 day, then BID for 1 day, then QD for 1 day       (Please note that portions of this note were completed with a voice recognition program.  Efforts were made to edit the dictations but occasionally words are mis-transcribed.)    Ulysses Rodney MD,  Attending Emergency Physician           Ulysses Rodney MD  08/11/23 [address] [facility] Joe CROCKER MD  08/15/23 [id number]

## 2023-08-11 NOTE — DISCHARGE INSTRUCTIONS
Continue taking any medication that you were given by the urgent care, you may also take Ativan small doses several times a day do not drive or operate heavy machinery while you are taking that. Return back to emergency setting if you are worsening anyway. Call your doctors tomorrow.

## 2023-08-11 NOTE — ED PROVIDER NOTES
Hysterectomy. CURRENT MEDICATIONS       Previous Medications    ALBUTEROL SULFATE HFA (PROVENTIL;VENTOLIN;PROAIR) 108 (90 BASE) MCG/ACT INHALER    inhale 2 puffs every 4 hours if needed for wheezing    AZITHROMYCIN (ZITHROMAX) 250 MG TABLET    TAKE 2 TABLETS BY MOUTH TODAY, THEN TAKE 1 TABLET DAILY FOR 4 DAYS    CITALOPRAM (CELEXA) 20 MG TABLET    TAKE ONE TABLET BY MOUTH DAILY    CYCLOBENZAPRINE (FLEXERIL) 10 MG TABLET    Take 1 tablet by mouth in the morning, at noon, and at bedtime    DULAGLUTIDE (TRULICITY) 3 IB/2.6GB SOPN    Inject 3 mg into the skin once a week    FLUCONAZOLE (DIFLUCAN) 150 MG TABLET    TAKE ONE TABLET BY MOUTH EVERY 72 HOURS FOR 3 DOSES    HYDROXYZINE HCL (ATARAX) 25 MG TABLET    Take 1 tablet by mouth 3 times daily as needed for Itching    INSULIN NPH (HUMULIN N;NOVOLIN N) 100 UNIT/ML INJECTION VIAL    Inject 30 Units into the skin in the morning and at bedtime 10 in the Am 30 at bed    LISINOPRIL-HYDROCHLOROTHIAZIDE (PRINZIDE;ZESTORETIC) 20-25 MG PER TABLET    Take 1 tablet by mouth daily    NALOXONE 4 MG/0.1ML LIQD NASAL SPRAY        NYSTATIN (MYCOSTATIN) 016658 UNIT/GM POWDER    Apply 3 times daily. Apply 3 times daily. ONDANSETRON (ZOFRAN-ODT) 4 MG DISINTEGRATING TABLET    Take 1 tablet by mouth 3 times daily as needed for Nausea or Vomiting    QUETIAPINE (SEROQUEL) 25 MG TABLET    Take 1 tablet by mouth at bedtime    ROPINIROLE (REQUIP) 2 MG TABLET    Take 1 tablet by mouth daily       ALLERGIES     is allergic to advil [ibuprofen], cephalexin, morphine, sulfa antibiotics, sulfamethoxazole-trimethoprim, sumatriptan, codeine, penicillins, phenazopyridine, and tramadol. FAMILY HISTORY     has no family status information on file. family history is not on file. SOCIAL HISTORY      reports that she has never smoked. She has never been exposed to tobacco smoke.  She has never used smokeless tobacco. She reports that she does not drink alcohol and does not use

## 2023-08-13 ENCOUNTER — HOSPITAL ENCOUNTER (EMERGENCY)
Age: 43
Discharge: HOME | End: 2023-08-13
Attending: EMERGENCY MEDICINE
Payer: COMMERCIAL

## 2023-08-13 VITALS
OXYGEN SATURATION: 97 % | BODY MASS INDEX: 40.66 KG/M2 | HEART RATE: 98 BPM | RESPIRATION RATE: 20 BRPM | SYSTOLIC BLOOD PRESSURE: 141 MMHG | DIASTOLIC BLOOD PRESSURE: 98 MMHG | WEIGHT: 253 LBS | HEIGHT: 66 IN

## 2023-08-13 DIAGNOSIS — F11.20 NARCOTIC DEPENDENCE (HCC): Primary | ICD-10-CM

## 2023-08-13 PROCEDURE — 99282 EMERGENCY DEPT VISIT SF MDM: CPT

## 2023-08-13 ASSESSMENT — ENCOUNTER SYMPTOMS
SHORTNESS OF BREATH: 0
DIARRHEA: 0
NAUSEA: 0
BACK PAIN: 0
COUGH: 0
VOMITING: 0
ABDOMINAL PAIN: 0

## 2023-08-13 NOTE — DISCHARGE INSTRUCTIONS
Please understand that at this time there is no evidence for a more serious underlying process, but that early in the process of an illness or injury, an emergency department workup can be falsely reassuring. You should contact your family doctor within the next 48 hours for a follow up appointment    [address]!!!    From [facility] [address] and [facility] Emergency Services    On behalf of the Emergency Department staff at [address] [place] [address], I would like to thank you for giving us the opportunity to address your health care needs and concerns. We hope that during your visit, our service was delivered in a professional and caring manner. Please keep [address] [place] [address] in mind as we walk with you down the path to your own personal wellness. Please expect an automated text message or email from us so we can ask a few questions about your health and progress. Based on your answers, a clinician may call you back to offer help and instructions. Please understand that early in the process of an illness or injury, an emergency department workup can be falsely reassuring. If you notice any worsening, changing or persistent symptoms please call your family doctor or return to the ER immediately. Tell us how we did during your visit at [email or web address]   and let us know about your experience

## 2023-08-13 NOTE — ED PROVIDER NOTES
[facility] Emergency Department  [address] [address] [facility]. [facility] [address]  Phone: [phone]  Fax: [phone]        Pt Name: Renea Miller  MRN: [id number]  [address] [place] 1980  Date of evaluation: 8/13/23    [address]       Chief Complaint   Patient presents with    Other     States she is in narcotic withdraw, last narcotic taken 7/31/23       HISTORY OF PRESENT ILLNESS (Location/Symptom, Timing/Onset, Context/Setting, Quality, Duration, Modifying Factors, Severity)      Renea Miller is a 37 y.o. female with no pertinent PMH who presents to the ED via private auto with leg twitching. Patient states this occurs when she is withdrawing from opiates. She states her last use to me was last night but told the nurse was 2 weeks ago. She denies any fevers, chills, chest pain, shortness of breath, difficulty breathing, Shane pain nausea vomiting diarrhea. States he was seen here few days ago where she was given benzodiazepines for her withdrawals and is requesting either prescription for her oxycodone or benzodiazepines. On arrival she is resting Comfortably with even unlabored breaths and is nontoxic-appearing no acute distress noted speaking in full complete sentences. PAST MEDICAL / SURGICAL / SOCIAL / FAMILY HISTORY     PMH:  has a past medical history of Diabetes mellitus ([address]), Hernia of anterior abdominal wall, Hypertension, and Restless leg syndrome. Surgical History:  has a past surgical history that includes Appendectomy; back surgery; and Hysterectomy. Social History:  reports that she has never smoked. She has never been exposed to tobacco smoke. She has never used smokeless tobacco. She reports that she does not drink alcohol and does not use drugs. Family History: has no family status information on file. family history is not on file.   Psychiatric History: None    Allergies: Advil [ibuprofen], Cephalexin, Morphine, Sulfa antibiotics,

## 2023-08-14 NOTE — ED PROVIDER NOTES
[facility] Emergency Department  [address] [address] [facility]. [facility] [address]  Phone: [phone]  Fax: [phone]      Attending Physician Attestation    Based on the medical record, the care appears appropriate. I was personally available for consultation in the Emergency Department. I did have a discussion with our midlevel provider regarding the care of this patient. I reviewed the mid level provider's note and agree with the documented findings and plan of care. I have reviewed the emergency nurses triage note. I agree with the chief complaint, past medical history, past surgical history, allergies, medications, social and family history as documented unless otherwise noted below. CHIEF COMPLAINT       Chief Complaint   Patient presents with    Other     States she is in narcotic withdraw, last narcotic taken 7/31/23         PAST MEDICAL HISTORY    has a past medical history of Diabetes mellitus ([address]), Hernia of anterior abdominal wall, Hypertension, and Restless leg syndrome. SURGICAL HISTORY      has a past surgical history that includes Appendectomy; back surgery; and Hysterectomy.     CURRENT MEDICATIONS       Discharge Medication List as of 8/13/2023  4:40 PM        CONTINUE these medications which have NOT CHANGED    Details   hydrOXYzine HCl (ATARAX) 25 MG tablet Take 1 tablet by mouth 3 times daily as needed for ItchingHistorical Med      Dulaglutide (TRULICITY) 3 PP/3.8AD SOPN Inject 3 mg into the skin once a week, Disp-3 Adjustable Dose Pre-filled Pen Syringe, R-3Normal      citalopram (CELEXA) 20 MG tablet TAKE ONE TABLET BY MOUTH DAILY, Disp-30 tablet, R-0Normal      ondansetron (ZOFRAN-ODT) 4 MG disintegrating tablet Take 1 tablet by mouth 3 times daily as needed for Nausea or Vomiting, Disp-21 tablet, R-1Normal      lisinopril-hydroCHLOROthiazide (PRINZIDE;ZESTORETIC) 20-25 MG per tablet Take 1 tablet by mouth daily, Disp-90 tablet, R-3Normal      fluconazole

## 2023-08-16 ENCOUNTER — TELEPHONE (OUTPATIENT)
Dept: FAMILY MEDICINE CLINIC | Age: 43
End: 2023-08-16

## 2023-08-16 NOTE — TELEPHONE ENCOUNTER
----- Message from [facility] sent at 8/16/2023  4:34 PM EDT -----  Subject: Medication Problem    Medication: Dulaglutide (TRULICITY) 3 SK/6.0LL SOPN  Dosage: 3 MG  Ordering Provider: sanchez    Question/Problem: Patient called in to request for her provider to give   her insurance company a call in regards to her medication. Patient is   requesting for her provider to let her insurance know that she needs her   medication because the insurance no longer covers the medication and the   patient would have to pay 2,000 dollars.  Please contact patient to further   assist.       Pharmacy: [address]   [phone] Merrill Fu [phone]    ---------------------------------------------------------------------------  --------------  Celina BLANCA  [phone]; OK to leave message on voicemail  ---------------------------------------------------------------------------  --------------    SCRIPT ANSWERS  Relationship to Patient: Self

## 2023-08-21 ENCOUNTER — HOSPITAL ENCOUNTER (OUTPATIENT)
Age: 43
Setting detail: SPECIMEN
Discharge: HOME | End: 2023-08-21

## 2023-08-21 ENCOUNTER — INITIAL CONSULT (OUTPATIENT)
Dept: PAIN MANAGEMENT | Age: 43
End: 2023-08-21
Payer: COMMERCIAL

## 2023-08-21 VITALS — BODY MASS INDEX: 40.66 KG/M2 | HEIGHT: 66 IN | WEIGHT: 253 LBS

## 2023-08-21 DIAGNOSIS — M54.17 LUMBOSACRAL NEURITIS: ICD-10-CM

## 2023-08-21 DIAGNOSIS — M48.061 SPINAL STENOSIS OF LUMBAR REGION, UNSPECIFIED WHETHER NEUROGENIC CLAUDICATION PRESENT: ICD-10-CM

## 2023-08-21 DIAGNOSIS — M47.817 LUMBOSACRAL SPONDYLOSIS WITHOUT MYELOPATHY: Primary | ICD-10-CM

## 2023-08-21 DIAGNOSIS — M25.551 PAIN OF RIGHT HIP: ICD-10-CM

## 2023-08-21 PROCEDURE — 99204 OFFICE O/P NEW MOD 45 MIN: CPT | Performed by: PAIN MEDICINE

## 2023-08-21 PROCEDURE — G8427 DOCREV CUR MEDS BY ELIG CLIN: HCPCS | Performed by: PAIN MEDICINE

## 2023-08-21 PROCEDURE — 1036F TOBACCO NON-USER: CPT | Performed by: PAIN MEDICINE

## 2023-08-21 PROCEDURE — G8417 CALC BMI ABV UP PARAM F/U: HCPCS | Performed by: PAIN MEDICINE

## 2023-08-21 RX ORDER — ONDANSETRON 4 MG/1
TABLET, FILM COATED ORAL
COMMUNITY
Start: 2023-08-12 | End: 2023-08-21

## 2023-08-21 RX ORDER — LORAZEPAM 1 MG/1
TABLET ORAL
COMMUNITY
Start: 2023-08-11 | End: 2023-08-21

## 2023-08-21 ASSESSMENT — ENCOUNTER SYMPTOMS
BOWEL INCONTINENCE: 0
BACK PAIN: 1

## 2023-08-22 DIAGNOSIS — M47.817 LUMBOSACRAL SPONDYLOSIS WITHOUT MYELOPATHY: ICD-10-CM

## 2023-08-25 LAB

## 2023-09-07 LAB

## 2023-10-20 DIAGNOSIS — R11.0 NAUSEA: ICD-10-CM

## 2023-10-20 RX ORDER — ONDANSETRON 4 MG/1
4 TABLET, ORALLY DISINTEGRATING ORAL 3 TIMES DAILY PRN
Qty: 21 TABLET | Refills: 1 | Status: SHIPPED | OUTPATIENT
Start: 2023-10-20

## 2023-10-29 DIAGNOSIS — R11.0 NAUSEA: ICD-10-CM

## 2023-10-31 RX ORDER — ONDANSETRON 4 MG/1
4 TABLET, ORALLY DISINTEGRATING ORAL 3 TIMES DAILY PRN
Qty: 21 TABLET | Refills: 1 | OUTPATIENT
Start: 2023-10-31

## 2023-11-07 DIAGNOSIS — R11.0 NAUSEA: ICD-10-CM

## 2023-11-07 RX ORDER — ONDANSETRON 4 MG/1
4 TABLET, ORALLY DISINTEGRATING ORAL 3 TIMES DAILY PRN
Qty: 21 TABLET | Refills: 1 | OUTPATIENT
Start: 2023-11-07

## 2024-03-13 DIAGNOSIS — G25.81 RLS (RESTLESS LEGS SYNDROME): ICD-10-CM

## 2024-03-13 RX ORDER — ROPINIROLE 2 MG/1
2 TABLET, FILM COATED ORAL DAILY
Qty: 7 TABLET | Refills: 0 | Status: SHIPPED | OUTPATIENT
Start: 2024-03-13 | End: 2024-03-20

## 2024-03-23 DIAGNOSIS — R11.0 NAUSEA: ICD-10-CM

## 2024-03-25 DIAGNOSIS — Z79.4 TYPE 2 DIABETES MELLITUS WITHOUT COMPLICATION, WITH LONG-TERM CURRENT USE OF INSULIN (HCC): ICD-10-CM

## 2024-03-25 DIAGNOSIS — I10 PRIMARY HYPERTENSION: ICD-10-CM

## 2024-03-25 DIAGNOSIS — R06.02 SOB (SHORTNESS OF BREATH): ICD-10-CM

## 2024-03-25 DIAGNOSIS — E11.9 TYPE 2 DIABETES MELLITUS WITHOUT COMPLICATION, WITH LONG-TERM CURRENT USE OF INSULIN (HCC): ICD-10-CM

## 2024-03-25 RX ORDER — ONDANSETRON 4 MG/1
4 TABLET, ORALLY DISINTEGRATING ORAL 3 TIMES DAILY PRN
Qty: 21 TABLET | Refills: 1 | Status: SHIPPED | OUTPATIENT
Start: 2024-03-25

## 2024-03-25 NOTE — TELEPHONE ENCOUNTER
LUMBAR SPINE WO CONTRAST Once 08/21/2023   XR LUMBAR SPINE FLEXION AND EXTENSION ONLY Once 08/21/2023       Next Visit Date:  No future appointments.         There is no problem list on file for this patient.

## 2024-03-26 DIAGNOSIS — E11.9 TYPE 2 DIABETES MELLITUS WITHOUT COMPLICATION, WITH LONG-TERM CURRENT USE OF INSULIN (HCC): ICD-10-CM

## 2024-03-26 DIAGNOSIS — R06.02 SOB (SHORTNESS OF BREATH): ICD-10-CM

## 2024-03-26 DIAGNOSIS — I10 PRIMARY HYPERTENSION: ICD-10-CM

## 2024-03-26 DIAGNOSIS — Z79.4 TYPE 2 DIABETES MELLITUS WITHOUT COMPLICATION, WITH LONG-TERM CURRENT USE OF INSULIN (HCC): ICD-10-CM

## 2024-03-26 DIAGNOSIS — G25.81 RLS (RESTLESS LEGS SYNDROME): ICD-10-CM

## 2024-03-26 RX ORDER — LISINOPRIL AND HYDROCHLOROTHIAZIDE 25; 20 MG/1; MG/1
1 TABLET ORAL DAILY
Qty: 90 TABLET | Refills: 3 | OUTPATIENT
Start: 2024-03-26 | End: 2025-03-26

## 2024-03-26 RX ORDER — ALBUTEROL SULFATE 90 UG/1
AEROSOL, METERED RESPIRATORY (INHALATION)
Qty: 18 G | Refills: 3 | OUTPATIENT
Start: 2024-03-26

## 2024-03-26 RX ORDER — DULAGLUTIDE 3 MG/.5ML
3 INJECTION, SOLUTION SUBCUTANEOUS WEEKLY
Qty: 3 ADJUSTABLE DOSE PRE-FILLED PEN SYRINGE | Refills: 3 | OUTPATIENT
Start: 2024-03-26

## 2024-03-28 RX ORDER — ROPINIROLE 2 MG/1
2 TABLET, FILM COATED ORAL DAILY
Qty: 7 TABLET | Refills: 0 | OUTPATIENT
Start: 2024-03-28 | End: 2024-04-04

## 2024-04-02 DIAGNOSIS — G25.81 RLS (RESTLESS LEGS SYNDROME): ICD-10-CM

## 2024-04-02 DIAGNOSIS — R11.0 NAUSEA: ICD-10-CM

## 2024-04-02 RX ORDER — ROPINIROLE 2 MG/1
2 TABLET, FILM COATED ORAL DAILY
Qty: 7 TABLET | Refills: 0 | OUTPATIENT
Start: 2024-04-02 | End: 2024-04-09

## 2024-04-02 RX ORDER — ONDANSETRON 4 MG/1
4 TABLET, ORALLY DISINTEGRATING ORAL 3 TIMES DAILY PRN
Qty: 21 TABLET | Refills: 1 | OUTPATIENT
Start: 2024-04-02

## 2024-04-09 DIAGNOSIS — R11.0 NAUSEA: ICD-10-CM

## 2024-04-09 RX ORDER — ONDANSETRON 4 MG/1
4 TABLET, ORALLY DISINTEGRATING ORAL 3 TIMES DAILY PRN
Qty: 21 TABLET | Refills: 1 | OUTPATIENT
Start: 2024-04-09

## 2024-08-19 DIAGNOSIS — R11.0 NAUSEA: ICD-10-CM

## 2024-08-21 DIAGNOSIS — R11.0 NAUSEA: ICD-10-CM

## 2024-08-21 RX ORDER — ONDANSETRON 4 MG/1
4 TABLET, ORALLY DISINTEGRATING ORAL 3 TIMES DAILY PRN
Qty: 21 TABLET | Refills: 1 | OUTPATIENT
Start: 2024-08-21

## 2024-10-06 DIAGNOSIS — R11.0 NAUSEA: ICD-10-CM

## 2024-10-08 RX ORDER — ONDANSETRON 4 MG/1
4 TABLET, ORALLY DISINTEGRATING ORAL 3 TIMES DAILY PRN
Qty: 21 TABLET | Refills: 1 | OUTPATIENT
Start: 2024-10-08